# Patient Record
Sex: MALE | Race: WHITE | NOT HISPANIC OR LATINO | Employment: FULL TIME | URBAN - METROPOLITAN AREA
[De-identification: names, ages, dates, MRNs, and addresses within clinical notes are randomized per-mention and may not be internally consistent; named-entity substitution may affect disease eponyms.]

---

## 2017-01-08 ENCOUNTER — TELEPHONE (OUTPATIENT)
Dept: FAMILY MEDICINE | Facility: CLINIC | Age: 27
End: 2017-01-08

## 2017-01-08 DIAGNOSIS — R06.2 WHEEZE: Primary | ICD-10-CM

## 2017-01-08 DIAGNOSIS — Z91.09 ENVIRONMENTAL ALLERGIES: ICD-10-CM

## 2017-01-08 NOTE — TELEPHONE ENCOUNTER
Alexis's mom said the pharmacy sent a few refill requests since Jan 4 and never heard anything back. He needs a refill of his albuterol as soon as possible. Send to the [email or web address] pharmacy in [place].    Thank you,  Genia SMITH   Central Scheduler

## 2017-01-09 RX ORDER — ALBUTEROL SULFATE 90 UG/1
2 AEROSOL, METERED RESPIRATORY (INHALATION) EVERY 4 HOURS PRN
Qty: 1 INHALER | Refills: 0 | Status: SHIPPED | OUTPATIENT
Start: 2017-01-09 | End: 2017-03-14

## 2017-01-09 NOTE — TELEPHONE ENCOUNTER
Albuterol        Last Written Prescription Date: 11/12/15  Last Fill Quantity: 1, # refills: 4    Last Office Visit with [facility], P or [facility] prescribing provider:  5/19/16   Future Office Visit:       Date of Last Asthma Action Plan Letter:   Asthma Action Plan Q1 Year    Topic Date Due     Asthma Action Plan - yearly  07/30/2015      Asthma Control Test:   ACT Total Scores 12/4/2014   ACT TOTAL SCORE 23   ASTHMA ER VISITS 0 = None   ASTHMA HOSPITALIZATIONS 0 = None       Date of Last Spirometry Test:   No results found for this or any previous visit.    Joana Logan RN

## 2017-01-09 NOTE — TELEPHONE ENCOUNTER
Routing refill request to provider for review/approval because:  Prescribed for wheezing and allergies, please advise on f/u   Per mother: Alexis's mom said the pharmacy sent a few refill requests since Jan 4 and never heard anything back. He needs a refill of his albuterol as soon as possible. Send to the [facility] pharmacy in [place].    Joana Logan RN

## 2017-01-09 NOTE — TELEPHONE ENCOUNTER
Spoke with mother an advised her that no prescription request was received from pharmacy.  Advised mother that prescription was sent to pharmacy.     Joana Logan RN

## 2017-03-14 ENCOUNTER — OFFICE VISIT (OUTPATIENT)
Dept: FAMILY MEDICINE | Facility: CLINIC | Age: 27
End: 2017-03-14
Payer: COMMERCIAL

## 2017-03-14 VITALS
HEART RATE: 107 BPM | SYSTOLIC BLOOD PRESSURE: 132 MMHG | TEMPERATURE: 98.2 F | HEIGHT: 73 IN | BODY MASS INDEX: 27.83 KG/M2 | WEIGHT: 210 LBS | OXYGEN SATURATION: 97 % | DIASTOLIC BLOOD PRESSURE: 64 MMHG

## 2017-03-14 DIAGNOSIS — F41.0 PANIC ATTACK: ICD-10-CM

## 2017-03-14 DIAGNOSIS — Z13.6 CARDIOVASCULAR SCREENING; LDL GOAL LESS THAN 160: ICD-10-CM

## 2017-03-14 DIAGNOSIS — J30.81 ALLERGIC RHINITIS DUE TO ANIMAL HAIR AND DANDER, UNSPECIFIED RHINITIS SEASONALITY: ICD-10-CM

## 2017-03-14 DIAGNOSIS — L30.1 DYSHIDROTIC ECZEMA: ICD-10-CM

## 2017-03-14 DIAGNOSIS — J45.20 INTERMITTENT ASTHMA, UNCOMPLICATED: Primary | ICD-10-CM

## 2017-03-14 LAB
CHOLEST SERPL-MCNC: 204 MG/DL
HDLC SERPL-MCNC: 48 MG/DL
LDLC SERPL CALC-MCNC: 139 MG/DL
NONHDLC SERPL-MCNC: 156 MG/DL
TRIGL SERPL-MCNC: 85 MG/DL

## 2017-03-14 PROCEDURE — 36415 COLL VENOUS BLD VENIPUNCTURE: CPT | Performed by: FAMILY MEDICINE

## 2017-03-14 PROCEDURE — 99213 OFFICE O/P EST LOW 20 MIN: CPT | Performed by: FAMILY MEDICINE

## 2017-03-14 PROCEDURE — 80061 LIPID PANEL: CPT | Performed by: FAMILY MEDICINE

## 2017-03-14 RX ORDER — HYDROXYZINE HYDROCHLORIDE 25 MG/1
25-50 TABLET, FILM COATED ORAL EVERY 6 HOURS PRN
Qty: 60 TABLET | Refills: 1 | Status: SHIPPED | OUTPATIENT
Start: 2017-03-14 | End: 2020-01-03

## 2017-03-14 RX ORDER — BETAMETHASONE DIPROPIONATE 0.5 MG/G
OINTMENT, AUGMENTED TOPICAL
Qty: 90 G | Refills: 10 | Status: SHIPPED | OUTPATIENT
Start: 2017-03-14 | End: 2020-01-03

## 2017-03-14 RX ORDER — ALBUTEROL SULFATE 90 UG/1
2 AEROSOL, METERED RESPIRATORY (INHALATION) EVERY 4 HOURS PRN
Qty: 1 INHALER | Refills: 3 | Status: SHIPPED | OUTPATIENT
Start: 2017-03-14 | End: 2018-01-24

## 2017-03-14 NOTE — MR AVS SNAPSHOT
After Visit Summary   3/14/2017    Alexis Ibrahim    MRN: [id number]           Patient Information     Date Of Birth          1990        Visit Information        Provider Department      3/14/2017 8:00 AM Colleen Cervantes MD [facility]        Today's Diagnoses     Intermittent asthma, uncomplicated    -  1    Dyshidrotic eczema        Panic attack        Allergic rhinitis due to animal hair and dander, unspecified rhinitis seasonality        CARDIOVASCULAR SCREENING; LDL GOAL LESS THAN 160          Care Instructions    [facility]    If you have any questions regarding to your visit please contact your care team:       Team Purple:   Clinic Hours Telephone Number   [address] Dr.   7am-7pm  Monday - Thursday   7am-5pm  Fridays  [phone]  (Appointment scheduling available 24/7)    Questions about your Visit?   Team Line:  [phone]   Urgent Care - [place] - 11am-9pm Monday-Friday Saturday-Sunday- 9am-5pm   [place] - 5pm-9pm Monday-Friday Saturday-Sunday- 9am-5pm  [phone] - [facility]  [phone] - [place]       What options do I have for visits at the clinic other than the traditional office visit?  To expand how we care for you, many of our providers are utilizing electronic visits (e-visits) and telephone visits, when medically appropriate, for interactions with their patients rather than a visit in the clinic.   We also offer nurse visits for many medical concerns. Just like any other service, we will bill your insurance company for this type of visit based on time spent on the phone with your provider. Not all insurance companies cover these visits. Please check with your medical insurance if this type of visit is covered. You will be responsible for any charges that are not paid by your insurance.      E-visits via TV TubeX:  generally incur a $35.00  fee.  Telephone visits:  Time spent on the phone: *charged based on time that is spent on the phone in increments of 10 minutes. Estimated cost:   5-10 mins $30.00   11-20 mins. $59.00   21-30 mins. $85.00     Use Dumbstruckhart (secure email communication and access to your chart) to send your primary care provider a message or make an appointment. Ask someone on your Team how to sign up for [employer].  For a Price Quote for your services, please call our deltamethod Line at [phone].  As always, Thank you for trusting us with your health care needs!            Follow-ups after your visit        Additional Services     ALLERGY/ASTHMA ADULT REFERRAL       Your provider has referred you to: [facility] [place] [facility] [place] [phone]  [email or web address]    Please be aware that coverage of these services is subject to the terms and limitations of your health insurance plan.  Call member services at your health plan with any benefit or coverage questions.      Please bring the following with you to your appointment:    (1) Any X-Rays, CTs or MRIs which have been performed.  Contact the facility where they were done to arrange for  prior to your scheduled appointment.    (2) List of current medications  (3) This referral request   (4) Any documents/labs given to you for this referral                  Who to contact     If you have questions or need follow up information about today's clinic visit or your schedule please contact [facility] directly at [phone].  Normal or non-critical lab and imaging results will be communicated to you by MyChart, letter or phone within 4 business days after the clinic has received the results. If you do not hear from us within 7 days, please contact the clinic through Dumbstruckhart or phone. If you have a critical or abnormal lab result, we will notify you by phone as soon as possible.  Submit refill requests through Dumbstruckhart or call your  "pharmacy and they will forward the refill request to us. Please allow 3 business days for your refill to be completed.          Additional Information About Your Visit        BEST [employer] Management[employer] Information     iPinYou gives you secure access to your electronic health record. If you see a primary care provider, you can also send messages to your care team and make appointments. If you have questions, please call your primary care clinic.  If you do not have a primary care provider, please call [phone] and they will assist you.        Care EveryWhere ID     This is your Care EveryWhere ID. This could be used by other organizations to access your [place] medical records  [id number]        Your Vitals Were     Pulse Temperature Height Pulse Oximetry BMI (Body Mass Index)       107 98.2  F (36.8  C) 6' 1.4\" (1.864 m) 97% 27.41 kg/m2        Blood Pressure from Last 3 Encounters:   03/14/17 132/64   05/19/16 110/68   11/25/15 128/71    Weight from Last 3 Encounters:   03/14/17 210 lb (95.3 kg)   05/19/16 202 lb (91.6 kg)   11/12/15 208 lb 8 oz (94.6 kg)              We Performed the Following     ALLERGY/ASTHMA ADULT REFERRAL     Lipid panel reflex to direct LDL          Where to get your medicines      These medications were sent to [id number] - [place], [place] - [address] [place] [address] [place] [address]     Phone:  [phone]     albuterol 108 (90 BASE) MCG/ACT Inhaler    augmented betamethasone dipropionate 0.05 % ointment    hydrOXYzine 25 MG tablet          Primary Care Provider Office Phone # Fax #    Colleen Cervantes [phone]       [facility] [address] [address]        Thank you!     Thank you for choosing [facility]  for your care. Our goal is always to provide you with excellent care. Hearing back from our patients is one way we can continue to improve our services. Please take a few minutes to complete the written " survey that you may receive in the mail after your visit with us. Thank you!             Your Updated Medication List - Protect others around you: Learn how to safely use, store and throw away your medicines at www.disposemymeds.org.          This list is accurate as of: 3/14/17  8:26 AM.  Always use your most recent med list.                   Brand Name Dispense Instructions for use    albuterol 108 (90 BASE) MCG/ACT Inhaler    PROAIR HFA/PROVENTIL HFA/VENTOLIN HFA    1 Inhaler    Inhale 2 puffs into the lungs every 4 hours as needed for shortness of breath / dyspnea       ALLEGRA 180 MG tablet   Generic drug:  fexofenadine      Take by mouth as needed       augmented betamethasone dipropionate 0.05 % ointment    DIPROLENE-AF    90 g    Apply sparingly to affected area once daily as needed.  Do not apply to face.       hydrOXYzine 25 MG tablet    ATARAX    60 tablet    Take 1-2 tablets (25-50 mg) by mouth every 6 hours as needed for anxiety

## 2017-03-14 NOTE — LETTER
[facility]  [address] [address]    March 15, 2017    Alexis Ibrahim  [address] [place] [address]          Dear Alexis,  Your lipid panel looks good. Try to eat a healthy diet and stay as active as you can.   Enclosed is a copy of your results.     Results for orders placed or performed in visit on 03/14/17   Lipid panel reflex to direct LDL   Result Value Ref Range    Cholesterol 204 (H) <200 mg/dL    Triglycerides 85 <150 mg/dL    HDL Cholesterol 48 >39 mg/dL    LDL Cholesterol Calculated 139 (H) <100 mg/dL    Non HDL Cholesterol 156 (H) <130 mg/dL       If you have any questions or concerns, please call myself or my nurse at [phone].      Sincerely,        Colleen Cervantes MD/pb

## 2017-03-14 NOTE — PROGRESS NOTES
SUBJECTIVE:                                                    Alexis Ibrahim is a 26 year old male who presents to clinic today for the following health issues:      Asthma Follow-Up    Was ACT completed today?    Yes    ACT Total Scores 3/14/2017   ACT TOTAL SCORE -   ASTHMA ER VISITS -   ASTHMA HOSPITALIZATIONS -   ACT TOTAL SCORE (Goal Greater than or Equal to 20) 17   In the past 12 months, how many times did you visit the emergency room for your asthma without being admitted to the hospital? 0   In the past 12 months, how many times were you hospitalized overnight because of your asthma? 0       Recent asthma triggers that patient is dealing with: dust        Amount of exercise or physical activity: 4-5 days/week for an average of 45-60 minutes    Problems taking medications regularly: No    Medication side effects: none  Diet: regular (no restrictions)        Problem list and histories reviewed & adjusted, as indicated.  Additional history: as documented    Patient Active Problem List   Diagnosis     CARDIOVASCULAR SCREENING; LDL GOAL LESS THAN 160     Past Surgical History   Procedure Laterality Date     None         Social History   Substance Use Topics     Smoking status: Never Smoker     Smokeless tobacco: Never Used     Alcohol use 0.0 oz/week     0 Standard drinks or equivalent per week     Family History   Problem Relation Age of Onset     Unknown/Adopted Mother      Unknown/Adopted Father          Current Outpatient Prescriptions   Medication Sig Dispense Refill     albuterol (PROAIR HFA/PROVENTIL HFA/VENTOLIN HFA) 108 (90 BASE) MCG/ACT Inhaler Inhale 2 puffs into the lungs every 4 hours as needed for shortness of breath / dyspnea 1 Inhaler 3     augmented betamethasone dipropionate (DIPROLENE-AF) 0.05 % ointment Apply sparingly to affected area once daily as needed.  Do not apply to face. 90 g 10     hydrOXYzine (ATARAX) 25 MG tablet Take 1-2 tablets (25-50 mg) by mouth every 6 hours as needed for  anxiety 60 tablet 1     fexofenadine (ALLEGRA) 180 MG tablet Take by mouth as needed       [DISCONTINUED] albuterol (PROAIR HFA/PROVENTIL HFA/VENTOLIN HFA) 108 (90 BASE) MCG/ACT Inhaler Inhale 2 puffs into the lungs every 4 hours as needed for shortness of breath / dyspnea 1 Inhaler 0     BP Readings from Last 3 Encounters:   03/14/17 132/64   05/19/16 110/68   11/25/15 128/71    Wt Readings from Last 3 Encounters:   03/14/17 210 lb (95.3 kg)   05/19/16 202 lb (91.6 kg)   11/12/15 208 lb 8 oz (94.6 kg)                  Labs reviewed in EPIC    Reviewed and updated as needed this visit by clinical staff  Tobacco  Allergies  Meds  Med Hx  Surg Hx  Fam Hx  Soc Hx      Reviewed and updated as needed this visit by Provider        Immunization History   Administered Date(s) Administered     DPT 1990, [birth year], 09/30/1992, 04/29/1996     HIB 1990, 1990, 1990, 07/15/1992     Hepatitis A Vac Ped/Adol-2 Dose 07/15/2008, 12/04/2014     Hepatitis B 04/02/1999, 05/26/1999, 12/24/1999     MMR 07/15/1992, 04/02/1999     Mantoux 08/01/2003     Meningococcal (Menomune ) 07/15/2008     OPV 1990, 1991, 09/30/1992, 04/29/1996     TD (ADULT, 7+) 08/01/2003     TDAP (ADACEL AGES 11-64) 12/30/2013     Varicella 04/29/1996, 07/15/2008        ROS:  This 26 year old male is here today to get his albuterol refilled. He never had asthma as a child and never needed albuterol for wheezing until he went to college. Now, he tends to need it when around cats or in the spring with the pollens in the air. He uses about 4 inhalers a year.  He is interested in allergy testing. Has never smoked. Has no cats. He uses allegra about once a week for allergy symptoms. His anxiety is under good control.  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 He stays active. Would like cholesterol checked. He tends to bring his lunch to work most days.  Declines HPV vaccinations. All other review of  "systems are negative  Personal, family, and social history reviewed with patient and revised.         OBJECTIVE:                                                    /64 (BP Location: Right arm, Patient Position: Chair, Cuff Size: Adult Large)  Pulse 107  Temp 98.2  F (36.8  C)  Ht 6' 1.4\" (1.864 m)  Wt 210 lb (95.3 kg)  SpO2 97%  BMI 27.41 kg/m2  Body mass index is 27.41 kg/(m^2).  GENERAL: healthy, alert and no distress  EYES: Eyes grossly normal to inspection, PERRL and conjunctivae and sclerae normal  HENT: ear canals and TM's normal, nose and mouth without ulcers or lesions  NECK: no adenopathy, no asymmetry, masses, or scars and thyroid normal to palpation  RESP: lungs clear to auscultation - no rales, rhonchi or wheezes  CV: regular rate and rhythm, normal S1 S2, no S3 or S4, no murmur, click or rub, no peripheral edema   MS: no gross musculoskeletal defects noted, no edema    Diagnostic Test Results:  none      ASSESSMENT/PLAN:                                                             1. Intermittent asthma, uncomplicated  ACT = 17 poor control   - albuterol (PROAIR HFA/PROVENTIL HFA/VENTOLIN HFA) 108 (90 BASE) MCG/ACT Inhaler; Inhale 2 puffs into the lungs every 4 hours as needed for shortness of breath / dyspnea  Dispense: 1 Inhaler; Refill: 3    2. Dyshidrotic eczema  Good control   - augmented betamethasone dipropionate (DIPROLENE-AF) 0.05 % ointment; Apply sparingly to affected area once daily as needed.  Do not apply to face.  Dispense: 90 g; Refill: 10    3. Panic attack  Good control   - hydrOXYzine (ATARAX) 25 MG tablet; Take 1-2 tablets (25-50 mg) by mouth every 6 hours as needed for anxiety  Dispense: 60 tablet; Refill: 1    4. Allergic rhinitis due to animal hair and dander, unspecified rhinitis seasonality  As above   - ALLERGY/ASTHMA ADULT REFERRAL    5. CARDIOVASCULAR SCREENING; LDL GOAL LESS THAN 160  due  - Lipid panel reflex to direct LDL    Return to clinic 1 year     VERNA P " MD MEET  [facility]

## 2017-03-14 NOTE — NURSING NOTE
"Chief Complaint   Patient presents with     Recheck Medication       Initial /64 (BP Location: Right arm, Patient Position: Chair, Cuff Size: Adult Large)  Pulse 107  Temp 98.2  F (36.8  C)  Ht 6' 1.4\" (1.864 m)  Wt 210 lb (95.3 kg)  SpO2 97%  BMI 27.41 kg/m2 Estimated body mass index is 27.41 kg/(m^2) as calculated from the following:    Height as of this encounter: 6' 1.4\" (1.864 m).    Weight as of this encounter: 210 lb (95.3 kg).  Medication Reconciliation: complete   Cara Mclaughlin MA      "

## 2017-03-14 NOTE — PATIENT INSTRUCTIONS
[facility]    If you have any questions regarding to your visit please contact your care team:       Team Purple:   Clinic Hours Telephone Number   [address] Dr.   7am-7pm  Monday - Thursday   7am-5pm  Fridays  [phone]  (Appointment scheduling available 24/7)    Questions about your Visit?   Team Line:  [phone]   Urgent Care - [place] [facility] - 11am-9pm Monday-Friday Saturday-Sunday- 9am-5pm   [place] - 5pm-9pm Monday-Friday Saturday-Sunday- 9am-5pm  [phone] - [facility]  [phone] - [place]       What options do I have for visits at the clinic other than the traditional office visit?  To expand how we care for you, many of our providers are utilizing electronic visits (e-visits) and telephone visits, when medically appropriate, for interactions with their patients rather than a visit in the clinic.   We also offer nurse visits for many medical concerns. Just like any other service, we will bill your insurance company for this type of visit based on time spent on the phone with your provider. Not all insurance companies cover these visits. Please check with your medical insurance if this type of visit is covered. You will be responsible for any charges that are not paid by your insurance.      E-visits via [employer]ut:  generally incur a $35.00 fee.  Telephone visits:  Time spent on the phone: *charged based on time that is spent on the phone in increments of 10 minutes. Estimated cost:   5-10 mins $30.00   11-20 mins. $59.00   21-30 mins. $85.00     Use [employer] (secure email communication and access to your chart) to send your primary care provider a message or make an appointment. Ask someone on your Team how to sign up for [employer].  For a Price Quote for your services, please call our Consumer Price Line at [phone].  As always, Thank you for trusting us with your health care needs!

## 2017-03-24 ENCOUNTER — TELEPHONE (OUTPATIENT)
Dept: FAMILY MEDICINE | Facility: CLINIC | Age: 27
End: 2017-03-24

## 2017-03-24 NOTE — TELEPHONE ENCOUNTER
Patient was in to see Helen on 03-14-17 and failed their ACT by scoring 17. Please put in the failed ACT workflow for follow-up. Thank you.

## 2017-03-24 NOTE — LETTER
[facility]  [address]  [place] [address]  [phone]      April 21, 2017        Alexis Ibrahim  [address]  [place] [address]          Dear Alexis,       Your clinic record indicates that you are due for an asthma update. We have a survey tool called an ACT (or Asthma Control Test) we use to measure the level of control of your asthma. Please complete the enclosed questionnaire and mail it back to us in the self-addressed stamped envelope.     If you have questions about this letter please contact your provider.             Sincerely,          Your [facility]

## 2017-03-24 NOTE — LETTER
[facility]  [address]  [place] [address]  [phone]    May 22, 2017      Alexis Ibrahim  [address]  [place] [address]      Dear Alexis,     Your clinic record indicates that you are due for an asthma update. We have a   survey tool called an ACT (or Asthma Control Test) we use to measure the  level of control of your asthma. Please complete the enclosed questionnaire   and mail it back to us in the self-addressed stamped envelope.     If you have questions about this letter please contact your provider.     Sincerely,      Your [facility]  [facility]

## 2017-05-31 ASSESSMENT — ASTHMA QUESTIONNAIRES: ACT_TOTALSCORE: 15

## 2017-08-02 ENCOUNTER — TELEPHONE (OUTPATIENT)
Dept: FAMILY MEDICINE | Facility: CLINIC | Age: 27
End: 2017-08-02

## 2017-08-02 PROBLEM — J45.20 INTERMITTENT ASTHMA, UNCOMPLICATED: Status: ACTIVE | Noted: 2017-08-02

## 2017-08-02 NOTE — TELEPHONE ENCOUNTER
Panel Management Review      Patient has the following on his problem list:     Asthma review     ACT Total Scores 5/30/2017   ACT TOTAL SCORE -   ASTHMA ER VISITS -   ASTHMA HOSPITALIZATIONS -   ACT TOTAL SCORE (Goal Greater than or Equal to 20) 15   In the past 12 months, how many times did you visit the emergency room for your asthma without being admitted to the hospital? 0   In the past 12 months, how many times were you hospitalized overnight because of your asthma? 0      1. Is Asthma diagnosis on the Problem List? No   2. Is Asthma listed on Health Maintenance? Yes    3. Patient is due for:  AAP and failing ACT        Composite cancer screening  Chart review shows that this patient is due/due soon for the following None  Summary:    Patient is due/failing the following:   AAP and Failing ACT    Action needed:   Patient needs to do ACT.    Type of outreach:    Copy of ACT mailed to patient, will reach out in 5 days.    Questions for provider review:    Please add Asthma diagnosis to Problem list.                                                                                                                                    Jumana Martins MA       Chart routed to Provider .

## 2017-08-02 NOTE — LETTER
[facility]  [address]  [place] [address]  [phone]    August 2, 2017      Alexis Ibrahim  [address]  [place] [place] [address]      Dear Alexis,     Your clinic record indicates that you are due for an asthma update. We have a survey tool called an ACT (or Asthma Control Test) we use to measure the level of control of your asthma. Please complete the enclosed questionnaire and mail it back to us in the self-addressed stamped envelope.     If you have questions about this letter please contact your provider.     Sincerely,    Your [facility]

## 2017-08-12 ASSESSMENT — ASTHMA QUESTIONNAIRES: ACT_TOTALSCORE: 13

## 2018-01-24 NOTE — PATIENT INSTRUCTIONS
Preventive Health Recommendations  Male Ages 26 - 39    Yearly exam:             See your health care provider every year in order to  o   Review health changes.   o   Discuss preventive care.    o   Review your medicines if your doctor has prescribed any.    You should be tested each year for STDs (sexually transmitted diseases), if you re at risk.     After age 35, talk to your provider about cholesterol testing. If you are at risk for heart disease, have your cholesterol tested at least every 5 years.     If you are at risk for diabetes, you should have a diabetes test (fasting glucose).  Shots: Get a flu shot each year. Get a tetanus shot every 10 years.     Nutrition:    Eat at least 5 servings of fruits and vegetables daily.     Eat whole-grain bread, whole-wheat pasta and brown rice instead of white grains and rice.     Talk to your provider about Calcium and Vitamin D.     Lifestyle    Exercise for at least 150 minutes a week (30 minutes a day, 5 days a week). This will help you control your weight and prevent disease.     Limit alcohol to one drink per day.     No smoking.     Wear sunscreen to prevent skin cancer.     See your dentist every six months for an exam and cleaning.       Preventive Health Recommendations  Male Ages 26 - 39    Yearly exam:             See your health care provider every year in order to  o   Review health changes.   o   Discuss preventive care.    o   Review your medicines if your doctor has prescribed any.    You should be tested each year for STDs (sexually transmitted diseases), if you re at risk.     After age 35, talk to your provider about cholesterol testing. If you are at risk for heart disease, have your cholesterol tested at least every 5 years.     If you are at risk for diabetes, you should have a diabetes test (fasting glucose).  Shots: Get a flu shot each year. Get a tetanus shot every 10 years.     Nutrition:    Eat at least 5 servings of fruits and vegetables  daily.     Eat whole-grain bread, whole-wheat pasta and brown rice instead of white grains and rice.     Talk to your provider about Calcium and Vitamin D.     Lifestyle    Exercise for at least 150 minutes a week (30 minutes a day, 5 days a week). This will help you control your weight and prevent disease.     Limit alcohol to one drink per day.     No smoking.     Wear sunscreen to prevent skin cancer.     See your dentist every six months for an exam and cleaning.   [facility]    If you have any questions regarding to your visit please contact your care team:       Team Purple:   Clinic Hours Telephone Number   Dr. Colleen Larkin   7am-7pm  Monday - Thursday   7am-5pm  Fridays  [phone]  (Appointment scheduling available 24/7)    Questions about your Visit?   Team Line:  [phone]   Urgent Care - [place] and [facility] - 11am-9pm Monday-Friday Saturday-Sunday- 9am-5pm   [place] - 5pm-9pm Monday-Friday Saturday-Sunday- 9am-5pm  [phone] - [place]   [phone] - [place]       What options do I have for visits at the clinic other than the traditional office visit?  To expand how we care for you, many of our providers are utilizing electronic visits (e-visits) and telephone visits, when medically appropriate, for interactions with their patients rather than a visit in the clinic.   We also offer nurse visits for many medical concerns. Just like any other service, we will bill your insurance company for this type of visit based on time spent on the phone with your provider. Not all insurance companies cover these visits. Please check with your medical insurance if this type of visit is covered. You will be responsible for any charges that are not paid by your insurance.      E-visits via EndoSphere:  generally incur a $35.00 fee.  Telephone visits:  Time spent on the phone: *charged based on time that is spent on  the phone in increments of 10 minutes. Estimated cost:   5-10 mins $30.00   11-20 mins. $59.00   21-30 mins. $85.00     Use Depositphotoshart (secure email communication and access to your chart) to send your primary care provider a message or make an appointment. Ask someone on your Team how to sign up for Spherixt.  For a Price Quote for your services, please call our Metabolon Line at [phone].  As always, Thank you for trusting us with your health care needs!

## 2018-01-31 ENCOUNTER — OFFICE VISIT (OUTPATIENT)
Dept: FAMILY MEDICINE | Facility: CLINIC | Age: 28
End: 2018-01-31
Payer: COMMERCIAL

## 2018-01-31 VITALS
HEIGHT: 73 IN | RESPIRATION RATE: 14 BRPM | BODY MASS INDEX: 27.57 KG/M2 | HEART RATE: 86 BPM | OXYGEN SATURATION: 97 % | DIASTOLIC BLOOD PRESSURE: 72 MMHG | TEMPERATURE: 97.9 F | SYSTOLIC BLOOD PRESSURE: 120 MMHG | WEIGHT: 208 LBS

## 2018-01-31 DIAGNOSIS — L40.9 PSORIASIS: ICD-10-CM

## 2018-01-31 DIAGNOSIS — J45.20 INTERMITTENT ASTHMA, UNCOMPLICATED: ICD-10-CM

## 2018-01-31 DIAGNOSIS — N50.812 TESTICULAR PAIN, LEFT: ICD-10-CM

## 2018-01-31 DIAGNOSIS — N45.1 EPIDIDYMITIS: ICD-10-CM

## 2018-01-31 DIAGNOSIS — Z00.00 ENCOUNTER FOR ROUTINE ADULT HEALTH EXAMINATION WITHOUT ABNORMAL FINDINGS: Primary | ICD-10-CM

## 2018-01-31 PROCEDURE — 99395 PREV VISIT EST AGE 18-39: CPT | Performed by: FAMILY MEDICINE

## 2018-01-31 RX ORDER — DOXYCYCLINE 100 MG/1
100 CAPSULE ORAL 2 TIMES DAILY
Qty: 30 CAPSULE | Refills: 0 | Status: SHIPPED | OUTPATIENT
Start: 2018-01-31 | End: 2020-01-03

## 2018-01-31 RX ORDER — ALBUTEROL SULFATE 90 UG/1
2 AEROSOL, METERED RESPIRATORY (INHALATION) EVERY 4 HOURS PRN
Qty: 1 INHALER | Refills: 3 | Status: SHIPPED | OUTPATIENT
Start: 2018-01-31 | End: 2018-04-16

## 2018-01-31 ASSESSMENT — PAIN SCALES - GENERAL: PAINLEVEL: NO PAIN (0)

## 2018-01-31 NOTE — PROGRESS NOTES
SUBJECTIVE:   CC: Alexis Ibrahim is an 27 year old male who presents for preventative health visit.     Healthy Habits:    Do you get at least three servings of calcium containing foods daily (dairy, green leafy vegetables, etc.)? yes    Amount of exercise or daily activities, outside of work: 3 day(s) per week    Problems taking medications regularly No    Medication side effects: No    Have you had an eye exam in the past two years? no    Do you see a dentist twice per year? no    Do you have sleep apnea, excessive snoring or daytime drowsiness?no            Today's PHQ-2 Score:   PHQ-2 ( 1999 Pfizer) 3/14/2017 5/19/2016   Q1: Little interest or pleasure in doing things 0 1   Q2: Feeling down, depressed or hopeless 0 1   PHQ-2 Score 0 2       Abuse: Current or Past(Physical, Sexual or Emotional)- No  Do you feel safe in your environment - Yes    Social History   Substance Use Topics     Smoking status: Never Smoker     Smokeless tobacco: Never Used     Alcohol use 0.0 oz/week     0 Standard drinks or equivalent per week      If you drink alcohol do you typically have >3 drinks per day or >7 drinks per week? No                      Last PSA: No results found for: PSA    Reviewed orders with patient. Reviewed health maintenance and updated orders accordingly - Yes  Labs reviewed in EPIC  BP Readings from Last 3 Encounters:   01/31/18 120/72   03/14/17 132/64   05/19/16 110/68    Wt Readings from Last 3 Encounters:   01/31/18 208 lb (94.3 kg)   03/14/17 210 lb (95.3 kg)   05/19/16 202 lb (91.6 kg)                  Current Outpatient Prescriptions   Medication Sig Dispense Refill     albuterol (PROAIR HFA/PROVENTIL HFA/VENTOLIN HFA) 108 (90 BASE) MCG/ACT Inhaler Inhale 2 puffs into the lungs every 4 hours as needed for shortness of breath / dyspnea 1 Inhaler 3     doxycycline (VIBRAMYCIN) 100 MG capsule Take 1 capsule (100 mg) by mouth 2 times daily 30 capsule 0     augmented betamethasone dipropionate  (DIPROLENE-AF) 0.05 % ointment Apply sparingly to affected area once daily as needed.  Do not apply to face. 90 g 10     hydrOXYzine (ATARAX) 25 MG tablet Take 1-2 tablets (25-50 mg) by mouth every 6 hours as needed for anxiety 60 tablet 1     fexofenadine (ALLEGRA) 180 MG tablet Take by mouth as needed       Allergies   Allergen Reactions     Penicillins Nausea and Vomiting       Reviewed and updated as needed this visit by clinical staff  Tobacco  Allergies  Meds  Fam Hx  Soc Hx        Reviewed and updated as needed this visit by Provider        Immunization History   Administered Date(s) Administered     HEPA 07/15/2008, 12/04/2014     HepB 04/02/1999, 05/26/1999, 12/24/1999     Hib (PRP-T) 1990, 1990, 1991, 07/15/1992     Historical DTP/aP 1991, 1991, 1991, 09/30/1992, 04/29/1996     MMR 07/15/1992, 04/02/1999     Mantoux Tuberculin Skin Test 08/01/2003     Meningococcal (Menomune ) 07/15/2008     OPV, trivalent, live 1991, 1991, 09/30/1992, 04/29/1996     TD (ADULT, 7+) 08/01/2003     TDAP Vaccine (Adacel) 12/30/2013     Varicella 04/29/1996, 07/15/2008        This 27 year old male is here today for annual male exam. 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 He likes his job. He is single and lives in a house that is very maureen. He feels he is using his albuterol inhaler more often this winter. He believes it is due to dust. He only takes allegra if he knows he will be exposed to cats.   He has felt discomfort in his left testicle lately, off and on. He is not sexually active but does masterbate regularly. He has a painful feeling in left testicle at end of ejaculation.   ROS:  C: NEGATIVE for fever, chills, change in weight  I: NEGATIVE for worrisome rashes, moles or lesions  E: NEGATIVE for vision changes or irritation  ENT: NEGATIVE for ear, mouth and throat problems  R: NEGATIVE for significant cough or SOB  CV: NEGATIVE  "for chest pain, palpitations or peripheral edema  GI: NEGATIVE for nausea, abdominal pain, heartburn, or change in bowel habits   male: negative for dysuria, hematuria, decreased urinary stream, erectile dysfunction, urethral discharge  M: NEGATIVE for significant arthralgias or myalgia  N: NEGATIVE for weakness, dizziness or paresthesias  P: NEGATIVE for changes in mood or affect    OBJECTIVE:   /72  Pulse 86  Temp 97.9  F (36.6  C) (Oral)  Resp 14  Ht 6' 1.43\" (1.865 m)  Wt 208 lb (94.3 kg)  SpO2 97%  BMI 27.13 kg/m2  EXAM:  GENERAL: healthy, alert and no distress  EYES: Eyes grossly normal to inspection, PERRL and conjunctivae and sclerae normal  HENT: ear canals and TM's normal, nose and mouth without ulcers or lesions  NECK: no adenopathy, no asymmetry, masses, or scars and thyroid normal to palpation  RESP: lungs clear to auscultation - no rales, rhonchi or wheezes  CV: regular rate and rhythm, normal S1 S2, no S3 or S4, no murmur, click or rub, no peripheral edema and peripheral pulses strong  ABDOMEN: soft, nontender, no hepatosplenomegaly, no masses and bowel sounds normal   (male): normal male genitalia without lesions or urethral discharge, no hernia, but his left testicle is tender to gentle exam touch. It seems more swollen. I believe he could have epididymitis.   MS: no gross musculoskeletal defects noted, no edema  SKIN: no suspicious lesions or rashes, but psoriasis of his hands and fingers is bad in this dry winter cold weather   NEURO: Normal strength and tone, mentation intact and speech normal  PSYCH: mentation appears normal, affect normal/bright    ASSESSMENT/PLAN:   1. Encounter for routine adult health examination without abnormal findings  Healthy man     2. Psoriasis  Good control with diprolene ointment. Declines a refill. I encouraged him to wear white cotton gloves at night     3. Intermittent asthma, uncomplicated  Poor control right now. I recommend that he add allegra " "daily   - albuterol (PROAIR HFA/PROVENTIL HFA/VENTOLIN HFA) 108 (90 BASE) MCG/ACT Inhaler; Inhale 2 puffs into the lungs every 4 hours as needed for shortness of breath / dyspnea  Dispense: 1 Inhaler; Refill: 3    4. Epididymitis  As above, he will get testicular ultrasound if his pain is not resolved with the doxycycline.   - doxycycline (VIBRAMYCIN) 100 MG capsule; Take 1 capsule (100 mg) by mouth 2 times daily  Dispense: 30 capsule; Refill: 0    5. Testicular pain, left  As above   - US SCROTUM AND CONTENTS; Future    COUNSELING:  Reviewed preventive health counseling, as reflected in patient instructions       Regular exercise       Healthy diet/nutrition       reports that he has never smoked. He has never used smokeless tobacco.    Estimated body mass index is 27.13 kg/(m^2) as calculated from the following:    Height as of this encounter: 6' 1.43\" (1.865 m).    Weight as of this encounter: 208 lb (94.3 kg).   Weight management plan: Discussed healthy diet and exercise guidelines and patient will follow up in 12 months in clinic to re-evaluate.    Counseling Resources:  ATP IV Guidelines  Pooled Cohorts Equation Calculator  FRAX Risk Assessment  ICSI Preventive Guidelines  Dietary Guidelines for Americans, 2010  USDA's MyPlate  ASA Prophylaxis  Lung CA Screening    VERNA DSOUZA MD  [facility]      "

## 2018-01-31 NOTE — MR AVS SNAPSHOT
After Visit Summary   1/31/2018    Alexis Ibrahim    MRN: [id number]           Patient Information     Date Of Birth          1990        Visit Information        Provider Department      1/31/2018 12:00 PM Colelen Cervantes MD [facility]        Today's Diagnoses     Encounter for routine adult health examination without abnormal findings    -  1    Psoriasis        Intermittent asthma, uncomplicated        Epididymitis        Testicular pain, left          Care Instructions      Preventive Health Recommendations  Male Ages 26 - 39    Yearly exam:             See your health care provider every year in order to  o   Review health changes.   o   Discuss preventive care.    o   Review your medicines if your doctor has prescribed any.    You should be tested each year for STDs (sexually transmitted diseases), if you re at risk.     After age 35, talk to your provider about cholesterol testing. If you are at risk for heart disease, have your cholesterol tested at least every 5 years.     If you are at risk for diabetes, you should have a diabetes test (fasting glucose).  Shots: Get a flu shot each year. Get a tetanus shot every 10 years.     Nutrition:    Eat at least 5 servings of fruits and vegetables daily.     Eat whole-grain bread, whole-wheat pasta and brown rice instead of white grains and rice.     Talk to your provider about Calcium and Vitamin D.     Lifestyle    Exercise for at least 150 minutes a week (30 minutes a day, 5 days a week). This will help you control your weight and prevent disease.     Limit alcohol to one drink per day.     No smoking.     Wear sunscreen to prevent skin cancer.     See your dentist every six months for an exam and cleaning.       Preventive Health Recommendations  Male Ages 26 - 39    Yearly exam:             See your health care provider every year in order to  o   Review health changes.   o   Discuss preventive care.    o   Review your  medicines if your doctor has prescribed any.    You should be tested each year for STDs (sexually transmitted diseases), if you re at risk.     After age 35, talk to your provider about cholesterol testing. If you are at risk for heart disease, have your cholesterol tested at least every 5 years.     If you are at risk for diabetes, you should have a diabetes test (fasting glucose).  Shots: Get a flu shot each year. Get a tetanus shot every 10 years.     Nutrition:    Eat at least 5 servings of fruits and vegetables daily.     Eat whole-grain bread, whole-wheat pasta and brown rice instead of white grains and rice.     Talk to your provider about Calcium and Vitamin D.     Lifestyle    Exercise for at least 150 minutes a week (30 minutes a day, 5 days a week). This will help you control your weight and prevent disease.     Limit alcohol to one drink per day.     No smoking.     Wear sunscreen to prevent skin cancer.     See your dentist every six months for an exam and cleaning.   [facility]    If you have any questions regarding to your visit please contact your care team:       Team Purple:   Clinic Hours Telephone Number   Dr. Colleen Larkin   7am-7pm  Monday - Thursday   7am-5pm  Fridays  [phone]  (Appointment scheduling available 24/7)    Questions about your Visit?   Team Line:  [phone]   Urgent Care - [place] - 11am-9pm Monday-Friday Saturday-Sunday- 9am-5pm   [place] - 5pm-9pm Monday-Friday Saturday-Sunday- 9am-5pm  [phone] - [place]   [phone] - [place]       What options do I have for visits at the clinic other than the traditional office visit?  To expand how we care for you, many of our providers are utilizing electronic visits (e-visits) and telephone visits, when medically appropriate, for interactions with their patients rather than a visit in the clinic.   We also offer  nurse visits for many medical concerns. Just like any other service, we will bill your insurance company for this type of visit based on time spent on the phone with your provider. Not all insurance companies cover these visits. Please check with your medical insurance if this type of visit is covered. You will be responsible for any charges that are not paid by your insurance.      E-visits via Daylight Studioshart:  generally incur a $35.00 fee.  Telephone visits:  Time spent on the phone: *charged based on time that is spent on the phone in increments of 10 minutes. Estimated cost:   5-10 mins $30.00   11-20 mins. $59.00   21-30 mins. $85.00     Use HeatGear (secure email communication and access to your chart) to send your primary care provider a message or make an appointment. Ask someone on your Team how to sign up for HeatGear.  For a Price Quote for your services, please call our eCollect Line at [phone].  As always, Thank you for trusting us with your health care needs!                    Follow-ups after your visit        Future tests that were ordered for you today     Open Future Orders        Priority Expected Expires Ordered    US SCROTUM AND CONTENTS Routine 5/1/2018 7/30/2018 1/31/2018            Who to contact     If you have questions or need follow up information about today's clinic visit or your schedule please contact [facility] directly at [phone].  Normal or non-critical lab and imaging results will be communicated to you by Daylight Studioshart, letter or phone within 4 business days after the clinic has received the results. If you do not hear from us within 7 days, please contact the clinic through Daylight Studioshart or phone. If you have a critical or abnormal lab result, we will notify you by phone as soon as possible.  Submit refill requests through HeatGear or call your pharmacy and they will forward the refill request to us. Please allow 3 business days for your refill to be completed.           "Additional Information About Your Visit        MyChart Information     Twonq gives you secure access to your electronic health record. If you see a primary care provider, you can also send messages to your care team and make appointments. If you have questions, please call your primary care clinic.  If you do not have a primary care provider, please call [phone] and they will assist you.        Care EveryWhere ID     This is your Care EveryWhere ID. This could be used by other organizations to access your [place] medical records  [id number]        Your Vitals Were     Pulse Temperature Respirations Height Pulse Oximetry BMI (Body Mass Index)    86 97.9  F (36.6  C) (Oral) 14 6' 1.43\" (1.865 m) 97% 27.13 kg/m2       Blood Pressure from Last 3 Encounters:   01/31/18 120/72   03/14/17 132/64   05/19/16 110/68    Weight from Last 3 Encounters:   01/31/18 208 lb (94.3 kg)   03/14/17 210 lb (95.3 kg)   05/19/16 202 lb (91.6 kg)                 Today's Medication Changes          These changes are accurate as of 1/31/18 12:53 PM.  If you have any questions, ask your nurse or doctor.               Start taking these medicines.        Dose/Directions    doxycycline 100 MG capsule   Commonly known as:  VIBRAMYCIN   Used for:  Epididymitis   Started by:  Colleen Cervantes MD        Dose:  100 mg   Take 1 capsule (100 mg) by mouth 2 times daily   Quantity:  30 capsule   Refills:  0            Where to get your medicines      These medications were sent to [id number]  [place] [address]     Phone:  [phone]     albuterol 108 (90 BASE) MCG/ACT Inhaler    doxycycline 100 MG capsule                Primary Care Provider Office Phone # Fax #    Colleen Cervantes [phone]       [facility] [address] [place] [phone]        Equal Access to Services     JIMBO MCCORMICK AH: Hadii jonathan Tiwari " imanicarlos claudia shin ah. So [facility] [phone].    ATENCIÓN: Si raimundo valdes, tiene a gaxiola disposición servicios gratuitos de asistencia lingüística. Meño al [phone].    We comply with applicable federal civil rights laws and [place] laws. We do not discriminate on the basis of race, color, national origin, age, disability, sex, sexual orientation, or gender identity.            Thank you!     Thank you for choosing [facility]  for your care. Our goal is always to provide you with excellent care. Hearing back from our patients is one way we can continue to improve our services. Please take a few minutes to complete the written survey that you may receive in the mail after your visit with us. Thank you!             Your Updated Medication List - Protect others around you: Learn how to safely use, store and throw away your medicines at www.disposemymeds.org.          This list is accurate as of 1/31/18 12:53 PM.  Always use your most recent med list.                   Brand Name Dispense Instructions for use Diagnosis    albuterol 108 (90 BASE) MCG/ACT Inhaler    PROAIR HFA/PROVENTIL HFA/VENTOLIN HFA    1 Inhaler    Inhale 2 puffs into the lungs every 4 hours as needed for shortness of breath / dyspnea    Intermittent asthma, uncomplicated       ALLEGRA 180 MG tablet   Generic drug:  fexofenadine      Take by mouth as needed        augmented betamethasone dipropionate 0.05 % ointment    DIPROLENE-AF    90 g    Apply sparingly to affected area once daily as needed.  Do not apply to face.    Dyshidrotic eczema       doxycycline 100 MG capsule    VIBRAMYCIN    30 capsule    Take 1 capsule (100 mg) by mouth 2 times daily    Epididymitis       hydrOXYzine 25 MG tablet    ATARAX    60 tablet    Take 1-2 tablets (25-50 mg) by mouth every 6 hours as needed for anxiety    Panic attack

## 2018-02-01 ASSESSMENT — PATIENT HEALTH QUESTIONNAIRE - PHQ9: SUM OF ALL RESPONSES TO PHQ QUESTIONS 1-9: 0

## 2018-02-01 ASSESSMENT — ASTHMA QUESTIONNAIRES: ACT_TOTALSCORE: 18

## 2018-02-07 ENCOUNTER — TELEPHONE (OUTPATIENT)
Dept: FAMILY MEDICINE | Facility: CLINIC | Age: 28
End: 2018-02-07

## 2018-02-07 NOTE — TELEPHONE ENCOUNTER
Patient was in to see Dr. Cervantes and failed their ACT by scoring 18. Please put in the failed ACT workflow for follow-up. Thank you.

## 2018-02-07 NOTE — TELEPHONE ENCOUNTER
Letter and ACT mailed to patient.  -postponing message for 2 weeks and will check with patient in 2 weeks.     Jumana Martins MA

## 2018-02-07 NOTE — LETTER
February 7, 2018      Alexis Ibrahim  [address] [place] [address]      Dear Alexis,     Your clinic record indicates that you are due for an asthma update. We have a survey tool called an ACT (or Asthma Control Test) we use to measure the level of control of your asthma. Please complete the enclosed questionnaire and mail it back to us in the self-addressed stamped envelope.     If you have questions about this letter please contact your provider.     Sincerely,    [facility]

## 2018-02-12 ENCOUNTER — TELEPHONE (OUTPATIENT)
Dept: FAMILY MEDICINE | Facility: CLINIC | Age: 28
End: 2018-02-12

## 2018-02-12 DIAGNOSIS — N50.812 TESTICULAR PAIN, LEFT: Primary | ICD-10-CM

## 2018-02-12 NOTE — TELEPHONE ENCOUNTER
Patient called looking to schedule a testicular ultrasound, this or next week. No current orders on file.     1-31-18 encounter notes state course of antibiotics, follow up ultrasound if pain is unresolved.       Please call patient on his cell ( [phone]).

## 2018-02-13 NOTE — TELEPHONE ENCOUNTER
"Spoke with patient.  Pain has not improved and he would like to get the US completed at this point.    Routing to provider.  Called imaging and was told that for testicular pain, the order would need to be for \"US testicular and scrotum\"   \"US SCROTUM AND CONTENTS [54069 (CPT )]\"  That was ordered by provider on 1/31/18 does not show up on their end as something they can schedule  See order radha'd up  Please review and sign if appropriate    Please call patient back when ready    Sal Pearson RN    "

## 2018-02-14 ENCOUNTER — RADIANT APPOINTMENT (OUTPATIENT)
Dept: ULTRASOUND IMAGING | Facility: CLINIC | Age: 28
End: 2018-02-14
Attending: FAMILY MEDICINE
Payer: COMMERCIAL

## 2018-02-14 DIAGNOSIS — N50.812 TESTICULAR PAIN, LEFT: ICD-10-CM

## 2018-02-14 PROCEDURE — 76870 US EXAM SCROTUM: CPT

## 2018-02-15 ENCOUNTER — TELEPHONE (OUTPATIENT)
Dept: FAMILY MEDICINE | Facility: CLINIC | Age: 28
End: 2018-02-15

## 2018-02-15 NOTE — TELEPHONE ENCOUNTER
Panel Management Review      Patient has the following on his problem list:     Asthma review     ACT Total Scores 1/31/2018   ACT TOTAL SCORE -   ASTHMA ER VISITS -   ASTHMA HOSPITALIZATIONS -   ACT TOTAL SCORE (Goal Greater than or Equal to 20) 18   In the past 12 months, how many times did you visit the emergency room for your asthma without being admitted to the hospital? 0   In the past 12 months, how many times were you hospitalized overnight because of your asthma? 0      1. Is Asthma diagnosis on the Problem List? Yes    2. Is Asthma listed on Health Maintenance? Yes    3. Patient is due for:  Failed ACT      Composite cancer screening  Chart review shows that this patient is due/due soon for the following None  Summary:    Patient is due/failing the following:   ACT    Action needed:   Patient needs to do ACT.    Type of outreach:    none. ACT mailed out to patient on 02/07/2018    Questions for provider review:    None                                                                                                                                    Jumana Martins MA       Chart routed to none .

## 2018-02-21 NOTE — TELEPHONE ENCOUNTER
Patient returned call to Team [facility] Hotline. Patient is wondering why the clinic mailing out ACT to him. Informed patient that he failed last ACT on 01/31/2018 and we recheck ACT after 4 weeks. Patient verbally understand and will fill out and mail back ACT.     Jumana Martins MA

## 2018-02-21 NOTE — TELEPHONE ENCOUNTER
Called patient to see if he returned ACT. [facility] team number left.  Arianne FLORES CMA ([facility])

## 2018-04-16 ENCOUNTER — MYC REFILL (OUTPATIENT)
Dept: FAMILY MEDICINE | Facility: CLINIC | Age: 28
End: 2018-04-16

## 2018-04-16 DIAGNOSIS — J45.20 INTERMITTENT ASTHMA, UNCOMPLICATED: ICD-10-CM

## 2018-04-16 RX ORDER — ALBUTEROL SULFATE 90 UG/1
2 AEROSOL, METERED RESPIRATORY (INHALATION) EVERY 4 HOURS PRN
Qty: 1 INHALER | Refills: 1 | Status: SHIPPED | OUTPATIENT
Start: 2018-04-16 | End: 2019-02-12

## 2018-04-16 NOTE — TELEPHONE ENCOUNTER
Message from [place]:  Abigail Melendez RIGOBERTO Mon Apr 16, 2018 12:26 PM        ----- Message -----   From: Aleixs Ibrahim   Sent: 4/16/2018 12:23 PM   To:  Team [facility]  Subject: Medication Renewal Request     Original authorizing provider: MD Alexis LOPEZ would like a refill of the following medications:  albuterol (PROAIR HFA/PROVENTIL HFA/VENTOLIN HFA) 108 (90 BASE) MCG/ACT Inhaler [VERNA DSOUZA MD]    Preferred pharmacy: [id number] [address]    Comment:

## 2018-05-22 ENCOUNTER — TELEPHONE (OUTPATIENT)
Dept: FAMILY MEDICINE | Facility: CLINIC | Age: 28
End: 2018-05-22

## 2018-05-22 NOTE — LETTER
May 22, 2018      Alexis Ibrahim  [address] [place] [address]      Dear Alexis,     Your clinic record indicates that you are failing your ACT (or Asthma Control Test) which is   a survey tool that we use to measure the level of control of your asthma. A Score of 20 or Greater indicates that your symptoms are controlled and your medication are working as they should. Please complete the enclosed questionnaire and mail it back to us in the self-addressed stamped envelope.            If you have questions about this letter please contact your provider.     Sincerely,    Your [facility]

## 2018-05-22 NOTE — TELEPHONE ENCOUNTER
Panel Management Review      Patient has the following on his problem list:     Asthma review     ACT Total Scores 1/31/2018   ACT TOTAL SCORE -   ASTHMA ER VISITS -   ASTHMA HOSPITALIZATIONS -   ACT TOTAL SCORE (Goal Greater than or Equal to 20) 18   In the past 12 months, how many times did you visit the emergency room for your asthma without being admitted to the hospital? 0   In the past 12 months, how many times were you hospitalized overnight because of your asthma? 0      1. Is Asthma diagnosis on the Problem List? Yes    2. Is Asthma listed on Health Maintenance? Yes    3. Patient is due for:  Failed ACT      Composite cancer screening  Chart review shows that this patient is due/due soon for the following None  Summary:    Patient is due/failing the following:   ACT    Action needed:   Patient needs to do ACT.    Type of outreach:    Copy of ACT mailed to patient, will reach out in 5 days.   -Postponing message for 2 weeks and will outreach out to patient if ACT is not receive to clinic in 2 week.    Questions for provider review:    None                                                                                                                                    Jumana Martins MA       Chart routed to Care Team .

## 2018-09-17 ENCOUNTER — TELEPHONE (OUTPATIENT)
Dept: FAMILY MEDICINE | Facility: CLINIC | Age: 28
End: 2018-09-17

## 2018-09-17 NOTE — TELEPHONE ENCOUNTER
Panel Management Review      Patient has the following on his problem list:     Asthma review     ACT Total Scores 1/31/2018   ACT TOTAL SCORE -   ASTHMA ER VISITS -   ASTHMA HOSPITALIZATIONS -   ACT TOTAL SCORE (Goal Greater than or Equal to 20) 18   In the past 12 months, how many times did you visit the emergency room for your asthma without being admitted to the hospital? 0   In the past 12 months, how many times were you hospitalized overnight because of your asthma? 0      1. Is Asthma diagnosis on the Problem List? Yes    2. Is Asthma listed on Health Maintenance? Yes    3. Patient is due for:  ACT      Composite cancer screening  Chart review shows that this patient is due/due soon for the following None  Summary:    Patient is due/failing the following:   ACT    Action needed:   Patient needs to do ACT.    Type of outreach:    Copy of ACT mailed to patient, will reach out in 5 days.Postponing message for 2 weeks and will follow up wit patient if ACT is not received to clinic in 2 weeks.    Questions for provider review:    None                                                                                                                                    Jumana Martins MA       Chart routed to Care Team .

## 2018-09-17 NOTE — LETTER
September 17, 2018      Alexis Ibrahim  [address] [place] [address]      Dear Alexis,     Your clinic record indicates that you are failing your ACT (or Asthma Control Test) which is   a survey tool that we use to measure the level of control of your asthma. A Score of 20 or Greater indicates that your symptoms are controlled and your medication are working as they should. Please complete the enclosed questionnaire and mail it back to us in the self-addressed stamped envelope.        If you have questions about this letter please contact your provider.     Sincerely,    Your [facility]

## 2018-12-18 ENCOUNTER — TELEPHONE (OUTPATIENT)
Dept: FAMILY MEDICINE | Facility: CLINIC | Age: 28
End: 2018-12-18

## 2018-12-18 NOTE — TELEPHONE ENCOUNTER
Panel Management Review      Patient has the following on his problem list:     Asthma review     ACT Total Scores 1/31/2018   ACT TOTAL SCORE -   ASTHMA ER VISITS -   ASTHMA HOSPITALIZATIONS -   ACT TOTAL SCORE (Goal Greater than or Equal to 20) 18   In the past 12 months, how many times did you visit the emergency room for your asthma without being admitted to the hospital? 0   In the past 12 months, how many times were you hospitalized overnight because of your asthma? 0      1. Is Asthma diagnosis on the Problem List? Yes    2. Is Asthma listed on Health Maintenance? Yes    3. Patient is due for:  ACT and AAP      Composite cancer screening  Chart review shows that this patient is due/due soon for the following None  Summary:    Patient is due/failing the following:   AAP and ACT    Action needed:   Patient needs to do ACT.    Type of outreach:    Sent letter. and Copy of ACT mailed to patient, will reach out in 5 days.   -postponing message for 2 weeks and will reach out to patient in 2 weeks if ACT is not receive to clinic.    Questions for provider review:    None                                                                                                                                    Jumana Martins MA       Chart routed to none .

## 2018-12-18 NOTE — LETTER
December 18, 2018      Alexis Ibrahim  [address] [place] [id number]      Dear Alexis,     Your clinic record indicates that you are due for an asthma update. We have a survey tool called an ACT (or Asthma Control Test) we use to measure the level of control of your asthma. Please complete the enclosed questionnaire and mail it back to us in the self-addressed stamped envelope.     If you have questions about this letter please contact your provider.     Sincerely,    Your [facility]

## 2019-01-10 ENCOUNTER — TELEPHONE (OUTPATIENT)
Dept: FAMILY MEDICINE | Facility: CLINIC | Age: 29
End: 2019-01-10

## 2019-01-10 NOTE — TELEPHONE ENCOUNTER
Panel Management Review      Patient has the following on his problem list:     Asthma review     ACT Total Scores 1/31/2018   ACT TOTAL SCORE -   ASTHMA ER VISITS -   ASTHMA HOSPITALIZATIONS -   ACT TOTAL SCORE (Goal Greater than or Equal to 20) 18   In the past 12 months, how many times did you visit the emergency room for your asthma without being admitted to the hospital? 0   In the past 12 months, how many times were you hospitalized overnight because of your asthma? 0      1. Is Asthma diagnosis on the Problem List? Yes    2. Is Asthma listed on Health Maintenance? Yes    3. Patient is due for:  ACT and AAP      Composite cancer screening  Chart review shows that this patient is due/due soon for the following None  Summary:    Patient is due/failing the following:   AAP and ACT    Action needed:   Patient needs to do ACT.    Type of outreach:    none, outreached to patient on 12/18/2018    Questions for provider review:    None                                                                                                                                    Jumana Martins MA       Chart routed to none .

## 2019-02-12 ENCOUNTER — OFFICE VISIT (OUTPATIENT)
Dept: FAMILY MEDICINE | Facility: CLINIC | Age: 29
End: 2019-02-12
Payer: COMMERCIAL

## 2019-02-12 VITALS
WEIGHT: 222.4 LBS | SYSTOLIC BLOOD PRESSURE: 120 MMHG | TEMPERATURE: 97 F | DIASTOLIC BLOOD PRESSURE: 84 MMHG | RESPIRATION RATE: 16 BRPM | BODY MASS INDEX: 29.48 KG/M2 | OXYGEN SATURATION: 97 % | HEART RATE: 81 BPM | HEIGHT: 73 IN

## 2019-02-12 DIAGNOSIS — R21 RASH OF HANDS: ICD-10-CM

## 2019-02-12 DIAGNOSIS — Z00.00 ROUTINE HISTORY AND PHYSICAL EXAMINATION OF ADULT: Primary | ICD-10-CM

## 2019-02-12 DIAGNOSIS — H00.015 HORDEOLUM EXTERNUM OF LEFT LOWER EYELID: ICD-10-CM

## 2019-02-12 DIAGNOSIS — J45.20 INTERMITTENT ASTHMA, UNCOMPLICATED: ICD-10-CM

## 2019-02-12 DIAGNOSIS — K60.2 ANAL FISSURE: ICD-10-CM

## 2019-02-12 PROCEDURE — 99395 PREV VISIT EST AGE 18-39: CPT | Performed by: FAMILY MEDICINE

## 2019-02-12 PROCEDURE — 99213 OFFICE O/P EST LOW 20 MIN: CPT | Mod: 25 | Performed by: FAMILY MEDICINE

## 2019-02-12 RX ORDER — ALBUTEROL SULFATE 90 UG/1
2 AEROSOL, METERED RESPIRATORY (INHALATION) EVERY 6 HOURS PRN
Qty: 1 INHALER | Refills: 3 | Status: SHIPPED | OUTPATIENT
Start: 2019-02-12 | End: 2019-10-23

## 2019-02-12 ASSESSMENT — ENCOUNTER SYMPTOMS
DIARRHEA: 0
SORE THROAT: 0
HEADACHES: 0
FREQUENCY: 1
JOINT SWELLING: 0
CONSTIPATION: 0
MYALGIAS: 0
ABDOMINAL PAIN: 0
EYE PAIN: 0
HEMATURIA: 0
ARTHRALGIAS: 0
WEAKNESS: 0
FEVER: 0
PALPITATIONS: 0
CHILLS: 0
DIZZINESS: 0
HEARTBURN: 0
NERVOUS/ANXIOUS: 0
HEMATOCHEZIA: 1
DYSURIA: 0
COUGH: 0
NAUSEA: 0
SHORTNESS OF BREATH: 0
PARESTHESIAS: 0

## 2019-02-12 ASSESSMENT — MIFFLIN-ST. JEOR: SCORE: 2044.42

## 2019-02-12 NOTE — PROGRESS NOTES
SUBJECTIVE:   CC: Alexis Ibrahim is an 28 year old male who presents for preventative health visit.     Physical   Annual:     Getting at least 3 servings of Calcium per day:  Yes    Bi-annual eye exam:  NO    Dental care twice a year:  Yes    Sleep apnea or symptoms of sleep apnea:  None    Diet:  Regular (no restrictions)    Frequency of exercise:  4-5 days/week    Duration of exercise:  30-45 minutes    Taking medications regularly:  Yes    Medication side effects:  Not applicable    Additional concerns today:  Yes    PHQ-2 Total Score: 1      PROBLEMS TO ADD ON...  Asthma Follow-Up   Asthma acts up in the winter using it once daily at this time.  Was ACT completed today?    Yes    ACT Total Scores 2/12/2019   ACT TOTAL SCORE -   ASTHMA ER VISITS -   ASTHMA HOSPITALIZATIONS -   ACT TOTAL SCORE (Goal Greater than or Equal to 20) 18   In the past 12 months, how many times did you visit the emergency room for your asthma without being admitted to the hospital? 0   In the past 12 months, how many times were you hospitalized overnight because of your asthma? 0       Recent asthma triggers that patient is dealing with: animal dander and cold air     Stye: Left eye x 6 weeks, did warm compresses improved but not resolved.     Constipation: Some blood in stool with wiping; stools are hard.   Rash in Hands: tried different creams without success.     Screening from work: LDL and blood sugar>   Flu shot: Does not do.    Today's PHQ-2 Score:   PHQ-2 ( 1999 Pf[employer]) 2/12/2019   Q1: Little interest or pleasure in doing things 1   Q2: Feeling down, depressed or hopeless 0   PHQ-2 Score 1   Q1: Little interest or pleasure in doing things Several days   Q2: Feeling down, depressed or hopeless Not at all   PHQ-2 Score 1       Abuse: Current or Past(Physical, Sexual or Emotional)- No  Do you feel safe in your environment? Yes    Social History     Tobacco Use     Smoking status: Never Smoker     Smokeless tobacco: Never Used  "  Substance Use Topics     Alcohol use: Yes     Alcohol/week: 0.0 oz     Alcohol Use 2/12/2019   If you drink alcohol do you typically have greater than 3 drinks per day OR greater than 7 drinks per week? No   No flowsheet data found.    Last PSA: No results found for: PSA    Reviewed orders with patient. Reviewed health maintenance and updated orders accordingly - Yes  Patient Active Problem List   Diagnosis     CARDIOVASCULAR SCREENING; LDL GOAL LESS THAN 160     Intermittent asthma, uncomplicated     Past Surgical History:   Procedure Laterality Date     none         Social History     Tobacco Use     Smoking status: Never Smoker     Smokeless tobacco: Never Used   Substance Use Topics     Alcohol use: Yes     Alcohol/week: 0.0 oz     Family History   Problem Relation Age of Onset     Unknown/Adopted Mother      Unknown/Adopted Father          Reviewed and updated as needed this visit by Provider        Review of Systems   Constitutional: Negative for chills and fever.   HENT: Negative for congestion, ear pain, hearing loss and sore throat.    Eyes: Negative for pain and visual disturbance.   Respiratory: Negative for cough and shortness of breath.    Cardiovascular: Negative for chest pain, palpitations and peripheral edema.   Gastrointestinal: Positive for hematochezia. Negative for abdominal pain, constipation, diarrhea, heartburn and nausea.   Genitourinary: Positive for frequency. Negative for discharge, dysuria, genital sores, hematuria, impotence and urgency.   Musculoskeletal: Negative for arthralgias, joint swelling and myalgias.   Skin: Negative for rash.   Neurological: Negative for dizziness, weakness, headaches and paresthesias.   Psychiatric/Behavioral: Negative for mood changes. The patient is not nervous/anxious.      OBJECTIVE:   /84   Pulse 81   Temp 97  F (36.1  C) (Oral)   Resp 16   Ht 1.865 m (6' 1.43\")   Wt 100.9 kg (222 lb 6.4 oz)   SpO2 97%   BMI 29.00 kg/m      Physical " "Exam  GENERAL: healthy, alert and no distress  EYES: Eyes grossly normal to inspection, PERRL and conjunctivae and sclerae normal  HENT: ear canals and TM's normal, nose and mouth without ulcers or lesions  NECK: no adenopathy and thyroid normal to palpation  RESP: lungs clear to auscultation - no rales, rhonchi or wheezes  CV: regular rate and rhythm, normal S1 S2, no S3 or S4, no murmur, click or rub, no peripheral edema and peripheral pulses strong  ABDOMEN: soft, nontender, no hepatosplenomegaly, no masses and bowel sounds normal  RECTUM: Fissure at 12 oclock and residual hemorrhoid tag at 5 pm.  MS: no gross musculoskeletal defects noted, no edema  NEURO: Normal strength and tone, mentation intact and speech normal  PSYCH: mentation appears normal, affect normal/bright    Diagnostic Test Results:  none     ASSESSMENT/PLAN:   Alexis was seen today for physical and health maintenance.    Diagnoses and all orders for this visit:    Routine history and physical examination of adult    Intermittent asthma, uncomplicated  -     albuterol (PROAIR HFA/PROVENTIL HFA/VENTOLIN HFA) 108 (90 Base) MCG/ACT inhaler; Inhale 2 puffs into the lungs every 6 hours as needed for shortness of breath / dyspnea    Hordeolum externum of left lower eyelid        -    Not infectious, may take a little longer to resolve.    Rash of hands             Differentials: Eczema, Psoriasis; due to persistence` will have dermatology evaluation.  -     DERMATOLOGY REFERRAL    Anal fissure  -     nitroGLYcerin (NITRO-BID) 2 % OINT ointment; Apply about1 inch every 12 hours for up to 3 weeks    COUNSELING:   Reviewed preventive health counseling, as reflected in patient instructions       Regular exercise       Healthy diet/nutrition    BP Readings from Last 1 Encounters:   02/12/19 120/84     Estimated body mass index is 29 kg/m  as calculated from the following:    Height as of this encounter: 1.865 m (6' 1.43\").    Weight as of this encounter: " 100.9 kg (222 lb 6.4 oz).    BP Screening:   Last 3 BP Readings:    BP Readings from Last 3 Encounters:   02/12/19 120/84   01/31/18 120/72   03/14/17 132/64       The following was recommended to the patient:  Re-screen BP within a year and recommended lifestyle modifications  Weight management plan: Discussed healthy diet and exercise guidelines     reports that  has never smoked. he has never used smokeless tobacco.      Counseling Resources:  ATP IV Guidelines  Pooled Cohorts Equation Calculator  FRAX Risk Assessment  ICSI Preventive Guidelines  Dietary Guidelines for Americans, 2010  USDA's MyPlate  ASA Prophylaxis  Lung CA Screening    Jonnie Davis MD  [facility]

## 2019-02-12 NOTE — LETTER
My Asthma Action Plan  Name: Alexis Ibrahim   YOB: 1990  Date: 2/12/2019   My doctor: Jonnie Davis MD   My clinic: [facility]        My Control Medicine: None  My Rescue Medicine: Albuterol (Proair/Ventolin/Proventil) inhaler .   My Asthma Severity: intermittent  Avoid your asthma triggers: animal dander and cold air  animal dander  cold air            GREEN ZONE   Good Control    I feel good    No cough or wheeze    Can work, sleep and play without asthma symptoms       Take your asthma control medicine every day.     1. If exercise triggers your asthma, take your rescue medication    15 minutes before exercise or sports, and    During exercise if you have asthma symptoms  2. Spacer to use with inhaler: If you have a spacer, make sure to use it with your inhaler             YELLOW ZONE Getting Worse  I have ANY of these:    I do not feel good    Cough or wheeze    Chest feels tight    Wake up at night   1. Keep taking your Green Zone medications  2. Start taking your rescue medicine:    every 20 minutes for up to 1 hour. Then every 4 hours for 24-48 hours.  3. If you stay in the Yellow Zone for more than 12-24 hours, contact your doctor.  4. If you do not return to the Green Zone in 12-24 hours or you get worse, start taking your oral steroid medicine if prescribed by your provider.           RED ZONE Medical Alert - Get Help  I have ANY of these:    I feel awful    Medicine is not helping    Breathing getting harder    Trouble walking or talking    Nose opens wide to breathe       1. Take your rescue medicine NOW  2. If your provider has prescribed an oral steroid medicine, start taking it NOW  3. Call your doctor NOW  4. If you are still in the Red Zone after 20 minutes and you have not reached your doctor:    Take your rescue medicine again and    Call 911 or go to the emergency room right away    See your regular doctor within 2 weeks of an Emergency Room or Urgent Care  visit for follow-up treatment.          Annual Reminders:  Meet with Asthma Educator,  Flu Shot in the Fall, consider Pneumonia Vaccination for patients with asthma (aged 19 and older).    Pharmacy:    [id number] - [place], [place] - [address] [place] [address] [id number] - [place], [place] - [address]                      Asthma Triggers  How To Control Things That Make Your Asthma Worse    Triggers are things that make your asthma worse.  Look at the list below to help you find your triggers and what you can do about them.  You can help prevent asthma flare-ups by staying away from your triggers.      Trigger                                                          What you can do   Cigarette Smoke  Tobacco smoke can make asthma worse. Do not allow smoking in your home, car or around you.  Be sure no one smokes at a child s day care or school.  If you smoke, ask your health care provider for ways to help you quit.  Ask family members to quit too.  Ask your health care provider for a referral to Quit Plan to help you quit smoking, or call 5-190-481-PLAN.     Colds, Flu, Bronchitis  These are common triggers of asthma. Wash your hands often.  Don t touch your eyes, nose or mouth.  Get a flu shot every year.     Dust Mites  These are tiny bugs that live in cloth or carpet. They are too small to see. Wash sheets and blankets in hot water every week.   Encase pillows and mattress in dust mite proof covers.  Avoid having carpet if you can. If you have carpet, vacuum weekly.   Use a dust mask and HEPA vacuum.   Pollen and Outdoor Mold  Some people are allergic to trees, grass, or weed pollen, or molds. Try to keep your windows closed.  Limit time out doors when pollen count is high.   Ask you health care provider about taking medicine during allergy season.     Animal Dander  Some people are allergic to skin flakes, urine or saliva from pets with fur or feathers. Keep pets with fur or feathers out of your home.     If you can t keep the pet outdoors, then keep the pet out of your bedroom.  Keep the bedroom door closed.  Keep pets off cloth furniture and away from stuffed toys.     Mice, Rats, and Cockroaches  Some people are allergic to the waste from these pests.   Cover food and garbage.  Clean up spills and food crumbs.  Store grease in the refrigerator.   Keep food out of the bedroom.   Indoor Mold  This can be a trigger if your home has high moisture. Fix leaking faucets, pipes, or other sources of water.   Clean moldy surfaces.  Dehumidify basement if it is damp and smelly.   Smoke, Strong Odors, and Sprays  These can reduce air quality. Stay away from strong odors and sprays, such as perfume, powder, hair spray, paints, smoke incense, paint, cleaning products, candles and new carpet.   Exercise or Sports  Some people with asthma have this trigger. Be active!  Ask your doctor about taking medicine before sports or exercise to prevent symptoms.    Warm up for 5-10 minutes before and after sports or exercise.     Other Triggers of Asthma  Cold air:  Cover your nose and mouth with a scarf.  Sometimes laughing or crying can be a trigger.  Some medicines and food can trigger asthma.

## 2019-02-12 NOTE — PATIENT INSTRUCTIONS
Preventive Health Recommendations  Male Ages 26 - 39    Yearly exam:             See your health care provider every year in order to  o   Review health changes.   o   Discuss preventive care.    o   Review your medicines if your doctor has prescribed any.    You should be tested each year for STDs (sexually transmitted diseases), if you re at risk.     After age 35, talk to your provider about cholesterol testing. If you are at risk for heart disease, have your cholesterol tested at least every 5 years.     If you are at risk for diabetes, you should have a diabetes test (fasting glucose).  Shots: Get a flu shot each year. Get a tetanus shot every 10 years.     Nutrition:    Eat at least 5 servings of fruits and vegetables daily.     Eat whole-grain bread, whole-wheat pasta and brown rice instead of white grains and rice.     Get adequate Calcium and Vitamin D.     Lifestyle    Exercise for at least 150 minutes a week (30 minutes a day, 5 days a week). This will help you control your weight and prevent disease.     Limit alcohol to one drink per day.     No smoking.     Wear sunscreen to prevent skin cancer.     See your dentist every six months for an exam and cleaning.     Patient Education     Chalazion    A chalazion is a blocked, swollen oil gland in the eyelid. The eyelids have oil glands that lubricate the inside of the lids. If a gland becomes blocked, the oil builds up and causes the skin to swell.  A chalazion can take several weeks to grow. It can vary in size. It may appear on the inside or outside of the lid. In most cases, it occurs on the upper lid. The skin may be a normal color or may be red. A chalazion is usually not painful. But it can cause mild pain, soreness, sensitivity to light, eye discharge, and increased tearing.  A chalazion often lasts from a few weeks to a month. It often goes away on its own. A chalazion can be mistaken for a sty (infection of an oil gland) because they both appear  on the eyelid.  Why a chalazion forms  It s often unclear why a chalazion appears. But a chalazion can develop when you have any of the following conditions:    Chronic blepharitis, when eyelids become irritated    Acne rosacea    Seborrhea    Tuberculosis    Viral infection  Home care  If your healthcare provider finds that a chalazion is infected, he or she may prescribe an antibiotic drop or ointment. Use the medicine as directed.    Wash your hands carefully with soap and warm water before and after caring for your eye. This is to help prevent infection.    Apply a warm, moist towel or compress for 10 to 15 minutes, 3 to 4 times a day. This will reduce the swelling and soften the hardened oils blocking the duct.    Massage the area gently after applying the warm compress to help drain the chalazion. Or follow your healthcare provider s directions.    Don t try to pop or squeeze the chalazion.    Don t wear eye makeup until the chalazion has healed. Or follow your healthcare provider s directions.    Don t wear contact lenses until the chalazion has healed. Or follow your healthcare provider s directions.    Once a day, with eyes closed, clean your eyelids with baby shampoo or a moist eyelid cleansing wipe. This is to help reduce clogging of the duct, as well as help prevent a chalazion from returning. Ask your healthcare provider about products to clean your eyelids.  Follow-up care  Follow up with your healthcare provider, or as advised. If the chalazion does not heal in 4 weeks, you may be referred to a healthcare provider who specializes in eye care (an optometrist or ophthalmologist) for further evaluation and treatment. You may also be referred to an eye specialist if you have a large chalazion.  When to seek medical advice  Call your healthcare provider right away if any of these occur:    Chalazion returns to the same area repeatedly    Existing symptoms (such as pain, warmth, redness, and drainage) get  worse    New symptoms appear, such as eye pain, warmth or redness around the eye, eye drainage, or both the upper and lower lids of the same eye swell    You have visual changes or blurred vision    You have a headache that persists    You have a fever of 100.4 F (38 C) or higher, or as directed by your healthcare provider  Date Last Reviewed: 3/1/2018    5257-2471 [employer]. [address] [place] [address], [place] [address]. All rights reserved. This information is not intended as a substitute for professional medical care. Always follow your healthcare professional's instructions.

## 2019-02-13 ASSESSMENT — ASTHMA QUESTIONNAIRES: ACT_TOTALSCORE: 18

## 2019-03-19 ENCOUNTER — OFFICE VISIT (OUTPATIENT)
Dept: DERMATOLOGY | Facility: CLINIC | Age: 29
End: 2019-03-19
Payer: COMMERCIAL

## 2019-03-19 DIAGNOSIS — R21 RASH/SKIN ERUPTION: Primary | ICD-10-CM

## 2019-03-19 PROCEDURE — 11104 PUNCH BX SKIN SINGLE LESION: CPT | Performed by: DERMATOLOGY

## 2019-03-19 PROCEDURE — 99202 OFFICE O/P NEW SF 15 MIN: CPT | Mod: 25 | Performed by: DERMATOLOGY

## 2019-03-19 PROCEDURE — 88312 SPECIAL STAINS GROUP 1: CPT | Mod: TC | Performed by: DERMATOLOGY

## 2019-03-19 PROCEDURE — 88305 TISSUE EXAM BY PATHOLOGIST: CPT | Mod: TC | Performed by: DERMATOLOGY

## 2019-03-19 ASSESSMENT — PAIN SCALES - GENERAL: PAINLEVEL: NO PAIN (0)

## 2019-03-19 NOTE — PROGRESS NOTES
[facility] Dermatology Note      Dermatology Problem List:  1. Pink scaly plaques on MCPs and PIPs   -s/p biopsy 3/19/19    Encounter Date: Mar 19, 2019    CC:  Chief Complaint   Patient presents with     Derm Problem     has used betamethasone - help dryness, but not redness and too expensive - has not used in the last 6 months; also has ued triamcinolone 0.1% ointment with same results         History of Present Illness:  Mr. Alexis Ibrahim is a 28 year old male who presents in self referral for a rash on his hands. It is on the knuckles and finger tips and it is really dry and cracked red skin. He remembers it being severe in high school as well. He does not recall a history of eczema as a child. He has used augmented betamethasone which has helped with the dryness, but not the redness. The medication was too expensive, so he has not used it in 6 months. He has also used triamcinolone 0.1% ointment with the same result. He plays basketball in the winter, which exacerbates the problem. Patient denies a history of muscle weakness. For a moisturizer, he uses Vaseline lotion. He uses Admitly brand soaps. No other concerns addressed today.      Past Medical History:   Patient Active Problem List   Diagnosis     CARDIOVASCULAR SCREENING; LDL GOAL LESS THAN 160     Intermittent asthma, uncomplicated     Past Medical History:   Diagnosis Date     CARDIOVASCULAR SCREENING; LDL GOAL LESS THAN 160 5/2/2011     Emesis     NOCTURNAL     Past Surgical History:   Procedure Laterality Date     none         Social History:  Patient reports that  has never smoked. he has never used smokeless tobacco. He reports that he drinks alcohol. He reports that he does not use drugs. Pt plays basketball and works in IT.      Family History:  Family History   Problem Relation Age of Onset     Unknown/Adopted Mother      Unknown/Adopted Father        Medications:  Current Outpatient Medications   Medication Sig Dispense  Refill     albuterol (PROAIR HFA/PROVENTIL HFA/VENTOLIN HFA) 108 (90 Base) MCG/ACT inhaler Inhale 2 puffs into the lungs every 6 hours as needed for shortness of breath / dyspnea 1 Inhaler 3     augmented betamethasone dipropionate (DIPROLENE-AF) 0.05 % ointment Apply sparingly to affected area once daily as needed.  Do not apply to face. 90 g 10     doxycycline (VIBRAMYCIN) 100 MG capsule Take 1 capsule (100 mg) by mouth 2 times daily (Patient not taking: Reported on 2/12/2019) 30 capsule 0     fexofenadine (ALLEGRA) 180 MG tablet Take by mouth as needed       hydrOXYzine (ATARAX) 25 MG tablet Take 1-2 tablets (25-50 mg) by mouth every 6 hours as needed for anxiety 60 tablet 1     nitroGLYcerin (NITRO-BID) 2 % OINT ointment Apply about1 inch every 12 hours for up to 3 weeks 30 g 1       Allergies   Allergen Reactions     Cat Hair Extract Shortness Of Breath     Penicillins Nausea and Vomiting       Review of Systems:  -Constitutional: Patient is otherwise feeling well, in usual state of health.   -Skin: As above in HPI. No additional skin concerns.    Physical exam:  Vitals: There were no vitals taken for this visit.  GEN: This is a well developed, well-nourished male in no acute distress, in a pleasant mood.    SKIN: Sun-exposed skin, which includes the head/face, neck, both arms, digits, and/or nails was examined.   - pink scaly plaques over MCP and PIP of bilateral hands  - fssures at finger tips but no vesicles or bilsters on the palmar hands  - No other lesions of concern on areas examined.             Impression/Plan:  1. Pink scaly plaques over MCPs and PIPs. Suspect eczema as patient has both seasonal allergies and asthma, but not the classic distribution. Does not have the violaceous appearance of DM, but want to rule this out give location of eruption. Psoriasis a possibility but unlikely to affect only the hands. Ddx: hand eczema vs dermatomyositis vs psoriasis.    Punch biopsy:  After discussion of  benefits and risks including but not limited to bleeding/bruising, pain/swelling, infection, scar, incomplete removal, nerve damage/numbness, recurrence, and non-diagnostic biopsy, written consent, verbal consent and photographs were obtained. Time-out was performed. The area was cleaned with isopropyl alcohol. 0.5mL of 1% lidocaine with 1:100,000 epinephrine was injected to obtain adequate anesthesia of the lesion on the right second finger.  A 4 mm punch biopsy was performed. 4-0 ethilon sutures were utilized to approximate the epidermal edges. White petroleum jelly/Vaseline and a bandage was applied to the wound. Explicit verbal and written wound care instructions were provided. The patient left the Dermatology Clinic in good condition. The patient was counseled to follow up for suture removal in approximately 14 days.    In the meantime recommended cream based, like Vanicream or CeraVe moisturizer daily on the hands. At night recommended soak with Vaseline and white cotton gloves over top.     Recommended gentle skin care, with Cetaphil or Vanicream gentle facial cleanser as a daily soap.       Follow-up pending biopsy results.       Staff Involved:  Scribe/Staff    Scribe Disclosure  I, Shantell Akbar, am serving as a scribe to document services personally performed by Dr. Chanell Styles MD, based on data collection and the provider's statements to me.     Provider Disclosure:   The documentation recorded by the scribe accurately reflects the services I personally performed and the decisions made by me.    Chanell Styles MD    Department of Dermatology  [facility]: Phone: [phone], Fax:[phone]  [facility]: Phone: [phone], Fax: [phone]

## 2019-03-19 NOTE — NURSING NOTE
The following medication was given:     MEDICATION:  Lidocaine with epinephrine 1% 1:[id number]  ROUTE: SQ  SITE: see procedure note  DOSE: 1 CC  LOT #: -[id number]  : Edmund  EXPIRATION DATE: JAN-  NDC#: 8530-3884-26   Was there drug waste? No  Multi-dose vial: Yes    Isaura Perry LPN  March 19, 2019

## 2019-03-19 NOTE — NURSING NOTE
Alexis Ibrahim's goals for this visit include:   Chief Complaint   Patient presents with     Derm Problem     has used betamethasone - help dryness, but not redness and too expensive - has not used in the last 6 months; also has ued triamcinolone 0.1% ointment with same results       He requests these members of his care team be copied on today's visit information: NO    PCP: Colleen Cervantes    Referring Provider:  No referring provider defined for this encounter.    There were no vitals taken for this visit.    Do you need any medication refills at today's visit? NO    Jennifer Silva, [facility]

## 2019-03-22 LAB — COPATH REPORT: NORMAL

## 2019-03-26 ENCOUNTER — TELEPHONE (OUTPATIENT)
Dept: DERMATOLOGY | Facility: CLINIC | Age: 29
End: 2019-03-26

## 2019-03-26 DIAGNOSIS — L30.9 ECZEMA: Primary | ICD-10-CM

## 2019-03-26 RX ORDER — CLOBETASOL PROPIONATE 0.5 MG/G
OINTMENT TOPICAL
Qty: 30 G | Refills: 2 | Status: SHIPPED | OUTPATIENT
Start: 2019-03-26 | End: 2020-01-06

## 2019-03-26 NOTE — TELEPHONE ENCOUNTER
Notes recorded by Jessica Molina RN on 3/26/2019 at 8:21 AM CDT  Results reviewed with Duc.  He verbalized understanding and agreed with the plan.  He has Vanicream and white cotton gloves already.  Rx sent to [facility].  Jessica Molina RN    ------    Notes recorded by Chanell Galeas MD on 3/26/2019 at 7:24 AM CDT  Please call patient and let him know biopsy results and treatment plan below:    Biopsy most consistent with eczema.    Treatment for eczema is with frequent cream based moisturizers, use of gentle non-soap cleansers, and topical steroids as we discussed at the visit. He should aim to apply a cream based moisturizer morning, night, and each time his hands get wet throughout the day. When the hands are flared, he should soak them in warm water for 10 minutes prior to bed. After soaking, applying topical steroid followed by moisturizer (either cream based or Vaseline/Aquaphor) and then white cotton gloves over top. This will intensify the effects of the steroid.     RN: Please send rx for clobetasol 0.05% ointment (30g, 2 refills).    Please have the patient let us know if this is too expense so we can try another medication.    Thanks,    Chanell Galeas MD    Department of Dermatology  [facility]: Phone: [phone], Fax:[phone]  [facility]: Phone: [phone], Fax: [phone]     Dermatological path order and indications   Order: [id number]   Status:  Final result   Visible to patient:  Yes (MyChart) Dx:  Rash/skin eruption   Component 7d ago   Copath Report Patient Name: CAROLINA FLOYD   MR#: [id number]   Specimen #: [id number]   Collected: 3/19/2019   Received: 3/20/2019   Reported: 3/22/2019 10:15   Ordering Phy(s): CHANELL GALEAS     For improved result formatting, select 'View Enhanced Report Format' under    Linked Documents section.     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

## 2019-04-26 ENCOUNTER — TELEPHONE (OUTPATIENT)
Dept: FAMILY MEDICINE | Facility: CLINIC | Age: 29
End: 2019-04-26

## 2019-04-26 NOTE — TELEPHONE ENCOUNTER
Panel Management Review      Patient has the following on his problem list:     Asthma review     ACT Total Scores 2/12/2019   ACT TOTAL SCORE -   ASTHMA ER VISITS -   ASTHMA HOSPITALIZATIONS -   ACT TOTAL SCORE (Goal Greater than or Equal to 20) 18   In the past 12 months, how many times did you visit the emergency room for your asthma without being admitted to the hospital? 0   In the past 12 months, how many times were you hospitalized overnight because of your asthma? 0      1. Is Asthma diagnosis on the Problem List? Yes    2. Is Asthma listed on Health Maintenance? Yes    3. Patient is due for:  Failed ACT      Composite cancer screening  Chart review shows that this patient is due/due soon for the following None  Summary:    Patient is due/failing the following:   ACT    Action needed:   Patient needs to do ACT.    Type of outreach:    Sent letter. and Copy of ACT mailed to patient, will reach out in 5 days.    Questions for provider review:    None                                                                                                                                    Jumana Martins MA       Chart routed to Care Team .

## 2019-04-26 NOTE — LETTER
April 26, 2019      Alexis Ibrahim  [address] [facility] [address] [place] [id number]      Dear Alexis,     Your clinic record indicates that you are failing your ACT (or Asthma Control Test) which is   a survey tool that we use to measure the level of control of your asthma. A Score of 20 or Greater indicates that your symptoms are controlled and your medication are working as they should. Please complete the enclosed questionnaire and mail it back to us in the self-addressed stamped envelope.          If you have questions about this letter please contact your provider.     Sincerely,    Your [facility]

## 2019-05-10 NOTE — TELEPHONE ENCOUNTER
Called patient and left VM to call patient. Okay to speak to anyone on the purple team.  Arianne FLORES CMA ([facility])

## 2019-05-15 NOTE — TELEPHONE ENCOUNTER
Multiple attempts made to contact patient. Closing encounter.  Arianne FLORES CMA ([facility])

## 2019-09-30 ENCOUNTER — TELEPHONE (OUTPATIENT)
Dept: FAMILY MEDICINE | Facility: CLINIC | Age: 29
End: 2019-09-30

## 2019-09-30 NOTE — LETTER
September 30, 2019      Alexis Ibrahim  [address] [place] [id number]      Dear Alexis,     Your clinic record indicates that you are due for an asthma update. We have a survey tool called an ACT (or Asthma Control Test) we use to measure the level of control of your asthma. Please complete the enclosed questionnaire and mail it back to us in the self-addressed stamped envelope.     If you have questions about this letter please contact your provider.     Sincerely,      Your [facility]

## 2019-09-30 NOTE — TELEPHONE ENCOUNTER
Panel Management Review      Patient has the following on his problem list:     Asthma review     ACT Total Scores 2/12/2019   ACT TOTAL SCORE -   ASTHMA ER VISITS -   ASTHMA HOSPITALIZATIONS -   ACT TOTAL SCORE (Goal Greater than or Equal to 20) 18   In the past 12 months, how many times did you visit the emergency room for your asthma without being admitted to the hospital? 0   In the past 12 months, how many times were you hospitalized overnight because of your asthma? 0      1. Is Asthma diagnosis on the Problem List? Yes    2. Is Asthma listed on Health Maintenance? Yes    3. Patient is due for:  ACT      Composite cancer screening  Chart review shows that this patient is due/due soon for the following None  Summary:    Patient is due/failing the following:   ACT    Action needed:   Patient needs to do ACT.    Type of outreach:    Copy of ACT mailed to patient, will reach out in 5 days.    Questions for provider review:    None                                                                                                                                    Aviva Robbins MA       Chart routed to Care Team .

## 2019-10-23 ENCOUNTER — MYC REFILL (OUTPATIENT)
Dept: FAMILY MEDICINE | Facility: CLINIC | Age: 29
End: 2019-10-23

## 2019-10-23 DIAGNOSIS — J45.20 INTERMITTENT ASTHMA, UNCOMPLICATED: ICD-10-CM

## 2019-10-25 RX ORDER — ALBUTEROL SULFATE 90 UG/1
2 AEROSOL, METERED RESPIRATORY (INHALATION) EVERY 6 HOURS PRN
Qty: 1 INHALER | Refills: 0 | Status: SHIPPED | OUTPATIENT
Start: 2019-10-25 | End: 2020-01-06

## 2019-10-25 NOTE — TELEPHONE ENCOUNTER
Schedule follow-up appointment  Signed Prescriptions:                        Disp   Refills    albuterol (PROAIR HFA/PROVENTIL HFA/VENTOL*1 Inha*0        Sig: Inhale 2 puffs into the lungs every 6 hours as needed           for shortness of breath / dyspnea . No further           refills until follow-up appointment  Authorizing Provider: ROLANDO MCCLELLAN

## 2019-10-25 NOTE — TELEPHONE ENCOUNTER
"Routing refill request to provider for review/approval because:  Labs out of range:  ACT    Requested Prescriptions   Pending Prescriptions Disp Refills     albuterol (PROAIR HFA/PROVENTIL HFA/VENTOLIN HFA) 108 (90 Base) MCG/ACT inhaler 1 Inhaler 3     Sig: Inhale 2 puffs into the lungs every 6 hours as needed for shortness of breath / dyspnea       Asthma Maintenance Inhalers - Anticholinergics Failed - 10/23/2019  8:11 AM        Failed - Asthma control assessment score within normal limits in last 6 months     Please review ACT score.           Failed - Recent (6 mo) or future (30 days) visit within the authorizing provider's specialty     Patient had office visit in the last 6 months or has a visit in the next 30 days with authorizing provider or within the authorizing provider's specialty.  See \"Patient Info\" tab in inbasket, or \"Choose Columns\" in Meds & Orders section of the refill encounter.            Passed - Patient is age 12 years or older        Passed - Medication is active on med list        Emily Pacheco RN  "

## 2019-10-25 NOTE — TELEPHONE ENCOUNTER
Left patient VM to return call to purple team to schedule an OV  Maik Martins CMA on 10/25/2019 at 1:47 PM

## 2019-12-06 ENCOUNTER — TELEPHONE (OUTPATIENT)
Dept: FAMILY MEDICINE | Facility: CLINIC | Age: 29
End: 2019-12-06

## 2019-12-06 NOTE — TELEPHONE ENCOUNTER
Panel Management Review      Patient has the following on his problem list:     Asthma review     ACT Total Scores 2/12/2019   ACT TOTAL SCORE -   ASTHMA ER VISITS -   ASTHMA HOSPITALIZATIONS -   ACT TOTAL SCORE (Goal Greater than or Equal to 20) 18   In the past 12 months, how many times did you visit the emergency room for your asthma without being admitted to the hospital? 0   In the past 12 months, how many times were you hospitalized overnight because of your asthma? 0      1. Is Asthma diagnosis on the Problem List? Yes    2. Is Asthma listed on Health Maintenance? Yes    3. Patient is due for:  ACT      Composite cancer screening  Chart review shows that this patient is due/due soon for the following None  Summary:    Patient is due/failing the following:   ACT    Action needed:   Patient needs to do ACT.    Type of outreach:    Sent letter.    Questions for provider review:    None                                                                                                                                    Margaux Green [facility]         Chart routed to selene   .

## 2019-12-06 NOTE — LETTER
December 6, 2019      Alexis Ibrahim  [address] [facility] [address] [place] [id number]      Dear Alexis,     Your clinic record indicates that you are due for an asthma update. We have a survey tool called an ACT (or Asthma Control Test) we use to measure the level of control of your asthma. Please complete the enclosed questionnaire and mail it back to us in the self-addressed stamped envelope.     If you have questions about this letter please contact your provider.     Sincerely,    Your [facility]

## 2020-01-02 ENCOUNTER — APPOINTMENT (OUTPATIENT)
Dept: GENERAL RADIOLOGY | Facility: CLINIC | Age: 30
End: 2020-01-02
Attending: EMERGENCY MEDICINE
Payer: COMMERCIAL

## 2020-01-02 ENCOUNTER — HOSPITAL ENCOUNTER (EMERGENCY)
Facility: CLINIC | Age: 30
Discharge: HOME | End: 2020-01-02
Attending: EMERGENCY MEDICINE | Admitting: EMERGENCY MEDICINE
Payer: COMMERCIAL

## 2020-01-02 VITALS
DIASTOLIC BLOOD PRESSURE: 73 MMHG | HEIGHT: 73 IN | TEMPERATURE: 97.4 F | BODY MASS INDEX: 27.17 KG/M2 | WEIGHT: 205 LBS | SYSTOLIC BLOOD PRESSURE: 131 MMHG | OXYGEN SATURATION: 98 % | HEART RATE: 86 BPM | RESPIRATION RATE: 16 BRPM

## 2020-01-02 DIAGNOSIS — R07.9 CHEST PAIN, UNSPECIFIED TYPE: ICD-10-CM

## 2020-01-02 DIAGNOSIS — G58.8 OTHER MONONEUROPATHY: ICD-10-CM

## 2020-01-02 LAB
ANION GAP SERPL CALCULATED.3IONS-SCNC: 4 MMOL/L (ref 3–14)
BASOPHILS # BLD AUTO: 0.1 10E9/L (ref 0–0.2)
BASOPHILS NFR BLD AUTO: 0.8 %
BUN SERPL-MCNC: 8 MG/DL (ref 7–30)
CALCIUM SERPL-MCNC: 9.4 MG/DL (ref 8.5–10.1)
CHLORIDE SERPL-SCNC: 109 MMOL/L (ref 94–109)
CO2 SERPL-SCNC: 24 MMOL/L (ref 20–32)
CREAT SERPL-MCNC: 0.73 MG/DL (ref 0.66–1.25)
DIFFERENTIAL METHOD BLD: NORMAL
EOSINOPHIL # BLD AUTO: 0.2 10E9/L (ref 0–0.7)
EOSINOPHIL NFR BLD AUTO: 2.3 %
ERYTHROCYTE [DISTWIDTH] IN BLOOD BY AUTOMATED COUNT: 11.9 % (ref 10–15)
GFR SERPL CREATININE-BSD FRML MDRD: >90 ML/MIN/{1.73_M2}
GLUCOSE SERPL-MCNC: 111 MG/DL (ref 70–99)
HCT VFR BLD AUTO: 44.9 % (ref 40–53)
HGB BLD-MCNC: 16.1 G/DL (ref 13.3–17.7)
IMM GRANULOCYTES # BLD: 0 10E9/L (ref 0–0.4)
IMM GRANULOCYTES NFR BLD: 0.3 %
INTERPRETATION ECG - MUSE: NORMAL
LYMPHOCYTES # BLD AUTO: 1.8 10E9/L (ref 0.8–5.3)
LYMPHOCYTES NFR BLD AUTO: 22.1 %
MCH RBC QN AUTO: 29.2 PG (ref 26.5–33)
MCHC RBC AUTO-ENTMCNC: 35.9 G/DL (ref 31.5–36.5)
MCV RBC AUTO: 82 FL (ref 78–100)
MONOCYTES # BLD AUTO: 0.6 10E9/L (ref 0–1.3)
MONOCYTES NFR BLD AUTO: 7.1 %
NEUTROPHILS # BLD AUTO: 5.4 10E9/L (ref 1.6–8.3)
NEUTROPHILS NFR BLD AUTO: 67.4 %
NRBC # BLD AUTO: 0 10*3/UL
NRBC BLD AUTO-RTO: 0 /100
PLATELET # BLD AUTO: 321 10E9/L (ref 150–450)
POTASSIUM SERPL-SCNC: 3.7 MMOL/L (ref 3.4–5.3)
RBC # BLD AUTO: 5.51 10E12/L (ref 4.4–5.9)
SODIUM SERPL-SCNC: 137 MMOL/L (ref 133–144)
TROPONIN I SERPL-MCNC: <0.015 UG/L (ref 0–0.04)
WBC # BLD AUTO: 8 10E9/L (ref 4–11)

## 2020-01-02 PROCEDURE — 71046 X-RAY EXAM CHEST 2 VIEWS: CPT

## 2020-01-02 PROCEDURE — 99285 EMERGENCY DEPT VISIT HI MDM: CPT | Mod: 25

## 2020-01-02 PROCEDURE — 84484 ASSAY OF TROPONIN QUANT: CPT | Performed by: EMERGENCY MEDICINE

## 2020-01-02 PROCEDURE — 80048 BASIC METABOLIC PNL TOTAL CA: CPT | Performed by: EMERGENCY MEDICINE

## 2020-01-02 PROCEDURE — 85025 COMPLETE CBC W/AUTO DIFF WBC: CPT | Performed by: EMERGENCY MEDICINE

## 2020-01-02 PROCEDURE — 93005 ELECTROCARDIOGRAM TRACING: CPT

## 2020-01-02 ASSESSMENT — MIFFLIN-ST. JEOR: SCORE: 1953.75

## 2020-01-02 NOTE — ED TRIAGE NOTES
Patient reports chest pain on/off for the last week. He reports it has been inconsistent and not physically induced. Patient now reports left finger numbness.

## 2020-01-02 NOTE — ED PROVIDER NOTES
"  History     Chief Complaint:  Chest Pain    HPI   Alexis Ibrahim is a 29 year old male who presents with ***    Allergies:  Penicillins      Medications:    Albuterol   Allegra  Atarax    Past Medical History:    Asthma    Past Surgical History:    The patient does not have any pertinent past surgical history.    Family History:    No past pertinent family history.    Social History:  Negative for tobacco use.  Positive for alcohol use.  Negative for drug use.  Marital Status:  Single     Review of Systems  ***    Physical Exam     Patient Vitals for the past 24 hrs:   BP Temp Temp src Pulse Heart Rate Resp SpO2 Height Weight   01/02/20 1426 120/73 97.4  F (36.3  C) Oral 86 86 14 97 % 1.854 m (6' 1\") 93 kg (205 lb)     Physical Exam  ***    Emergency Department Course   ECG:  ***    Imaging:  XR Chest PA and LAT:   IMPRESSION: No acute cardiopulmonary disease. as per radiology.    Laboratory:  CBC: WBC: 8.0, HGB: 16.1, PLT: 321  BMP: Glucose 111, (Creatinine: 0.73)  1432 Troponin: <0.015    Procedures:  ***        Emergency Department Course:  ***       Impression & Plan    Medical Decision Making:  ***  Critical Care time:  {none or minutes:847354::\"none\"}    Diagnosis:  No diagnosis found.    Disposition:  {discharged to home/discharged to home with.../Admitted to...:717537}    Discharge Medications:  New Prescriptions    No medications on file       Mariel Wolf  1/2/2020    EMERGENCY DEPARTMENT    "

## 2020-01-02 NOTE — ED AVS SNAPSHOT
Emergency Department  [address] [place] [address]  Phone:  [phone]  Fax:  [phone]                                    Alexis Ibrahim   MRN: [id number]    Department:   Emergency Department   Date of Visit:  1/2/2020           After Visit Summary Signature Page    I have received my discharge instructions, and my questions have been answered. I have discussed any challenges I see with this plan with the nurse or doctor.    ..........................................................................................................................................  Patient/Patient Representative Signature      ..........................................................................................................................................  Patient Representative Print Name and Relationship to Patient    ..................................................               ................................................  Date                                   Time    ..........................................................................................................................................  Reviewed by Signature/Title    ...................................................              ..............................................  Date                                               Time          22EPIC Rev 08/18

## 2020-01-03 NOTE — ED PROVIDER NOTES
Visit Date:   01/02/2020      CHIEF COMPLAINT:  Chest pain.      HISTORY OF PRESENT ILLNESS:  This is a 29-year-old male who presents to the ED with chest pain.  He has been experiencing some chest pain over the past week.  It tends to be a crampy-like feeling in his left lower anterior chest.  It lasts for 5-10 minutes at the most.  It is not related to exercise or exertion or rest; moving, turning does not make a difference.  He cannot recall any injury.  He does not get short of breath or sweaty or nauseated with this.  He has not had this for a few days.  What brought him in today was he has noted some tingling in his left hand involving the middle 3 fingers.  This has been off and on for a day or two.  He did not know if this could be related to the chest discomfort.  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  There is no immediate family history of heart disease or blood clots.  He has had no travel, no leg swelling, and he has no cardiovascular risk factors such as smoking, diabetes, hypertension, high cholesterol.      PAST MEDICAL HISTORY:  Asthma and psoriasis.      MEDICATIONS:  Allegra and albuterol inhaler.  He does use some creams at times.      ALLERGIES:  CAT HAIR AND PENICILLIN.      FAMILY AND SOCIAL HISTORY:  Here with significant other.      REVIEW OF SYSTEMS:  As noted, all other systems being negative.      PHYSICAL EXAMINATION:   GENERAL:  Reveals a young white male supine.   VITAL SIGNS:  Blood pressure 120/73, temperature 97, pulse 86, respirations 14, pulse oximetry 97% on room air.   HEENT:  Head is atraumatic.  Pupils equal, reactive.  Extraocular movements intact.  Nares and oropharynx are clear.     NECK:  The neck  veins are flat.  Carotids no bruits.   LUNGS:  Clear.   CHEST WALL:  Nontender to palpation.   HEART:  Tones are regular without murmurs, rubs or gallops; 2+ radial and femoral pulses bilaterally.   ABDOMEN:  Soft without tenderness, masses or  hepatosplenomegaly.    MUSCULOSKELETAL:  Left arm when he rotates his shoulder on the left side, this seems to increase some tingling in the left hand.  Turning his neck does not make a difference.  When I tap over his volar wrist, he has a positive Tinel sign.  There is no swelling of the wrist itself, and there is a strong radial pulse and normal sensation and motor of the fingers.   NEUROLOGIC:  He is awake, alert and appropriate.  Normal motor, sensation and coordination throughout.   SKIN:  No rash.   LYMPHATICS:  No adenopathy.   PSYCHIATRIC:  Normal affect.      EMERGENCY DEPARTMENT COURSE:  Included EKG, chest x-ray and blood work.  Labs revealed essentially normal electrolytes, a white count of 8, hemoglobin 16, platelet 321 and a negative troponin less than 0.015.  Chest x-ray is unremarkable.  No signs of failure, pneumonia or pneumothorax.  An EKG reveals a normal sinus rhythm, right axis deviation, rate of 77, , , .      I discussed findings with the patient.  I think this is very unlikely to be acute coronary disease, PE or dissection.  It is most likely musculoskeletal chest wall pain.  He does not have signs of costochondritis.  peripheral neuropathy is a second problem, and I think this is likely due to his extensive computer use.  I have talked to him about getting a wrist rest and taking ibuprofen to see if this helps.  The patient will do the above.  He should follow up with his PMD this week.  If symptoms worsen, recheck in the ED.      IMPRESSION:   1.  Chest pain, likely musculoskeletal.   2.  Peripheral neuropathy.      PLAN:  As noted.         MELLY AMADO MD             D: 2020   T: 2020   MT: DON      Name:     CAROLINA FLOYD   MRN:      [id number]        Account:      [id number]   :      1990           Visit Date:   2020      Document: [id number]

## 2020-01-03 NOTE — PROGRESS NOTES
Subjective     Alexis Ibrahim is a 29 year old male who presents to clinic today for the following health issues:    HPI   Asthma Follow-Up    Uses inhaler x 2 daily. Been steady though.   Triggers:       Was ACT completed today?    Yes    ACT Total Scores 1/6/2020   ACT TOTAL SCORE -   ASTHMA ER VISITS -   ASTHMA HOSPITALIZATIONS -   ACT TOTAL SCORE (Goal Greater than or Equal to 20) 15   In the past 12 months, how many times did you visit the emergency room for your asthma without being admitted to the hospital? 0   In the past 12 months, how many times were you hospitalized overnight because of your asthma? 0       How many days per week do you miss taking your asthma controller medication?  I do not have an asthma controller medication    Please describe any recent triggers for your asthma: dust mites, animal dander, exercise or sports and emotions    Have you had any Emergency Room Visits, Urgent Care Visits, or Hospital Admissions since your last office visit?  no      How many servings of fruits and vegetables do you eat daily?  4 or more    On average, how many sweetened beverages do you drink each day (Examples: soda, juice, sweet tea, etc.  Do NOT count diet or artificially sweetened beverages)?   0    How many days per week do you miss taking your medication? 0    Flu shot: Does not do flu shot  No exposure to smoke    Numbness and tingling: finger left 3 lateral    Has had chest pain was in the ER    He does lots of keyboarding    Patient Active Problem List   Diagnosis     CARDIOVASCULAR SCREENING; LDL GOAL LESS THAN 160     Intermittent asthma, uncomplicated     Past Surgical History:   Procedure Laterality Date     none         Social History     Tobacco Use     Smoking status: Never Smoker     Smokeless tobacco: Never Used   Substance Use Topics     Alcohol use: Yes     Alcohol/week: 0.0 standard drinks     Comment: social      Family History   Problem Relation Age of Onset     Unknown/Adopted Mother       Unknown/Adopted Father          Review of Systems   Constitutional, HEENT, cardiovascular, pulmonary, gi and gu systems are negative, except as otherwise noted.      Objective    /62   Pulse 76   Temp 97.6  F (36.4  C) (Oral)   Wt 94.3 kg (208 lb)   SpO2 98%   BMI 27.44 kg/m    Body mass index is 27.44 kg/m .  Physical Exam   GENERAL: healthy, alert and no distress  NECK: no adenopathy and thyroid normal to palpation  RESP: lungs clear to auscultation - no rales, rhonchi or wheezes  CV: regular rate and rhythm, normal S1 S2, no S3 or S4, no murmur, click or rub  MS: Phalen's test: negative    Diagnostic Test Results:  Labs reviewed in Epic        Assessment & Plan     Alexis was seen today for asthma and medication request.    Diagnoses and all orders for this visit:    Mild intermittent asthma without complication    Not well controlled. Discussed the importance of compliance with medical regimen, and various treatment modalities such as beta agonists and inhaled steroids. The concepts of prophylactic and episodic or 'rescue' therapy has been discussed. The patient indicates understanding of these issues and knows when to call this office for help in treatment of asthma; will start an ICS     -     albuterol (PROAIR HFA/PROVENTIL HFA/VENTOLIN HFA) 108 (90 Base) MCG/ACT inhaler; Inhale 2 puffs into the lungs every 6 hours as needed for shortness of breath / dyspnea .     -     fluticasone (FLOVENT HFA) 110 MCG/ACT inhaler; Inhale 2 puffs into the lungs 2 times daily    Numbness and tingling of left hand       -   Consistent with nerve impingement. Discussed ergonomic factors.    Other orders  -     albuterol (PROAIR HFA/PROVENTIL HFA/VENTOLIN HFA) 108 (90 Base) MCG/ACT inhaler; Inhale 2 puffs into the lungs every 6 hours as needed for shortness of breath / dyspnea .       Return in about 4 weeks (around 2/3/2020) for Follow up ACT.    Jonnie Davis MD  [facility]

## 2020-01-06 ENCOUNTER — OFFICE VISIT (OUTPATIENT)
Dept: FAMILY MEDICINE | Facility: CLINIC | Age: 30
End: 2020-01-06
Payer: COMMERCIAL

## 2020-01-06 VITALS
OXYGEN SATURATION: 98 % | TEMPERATURE: 97.6 F | SYSTOLIC BLOOD PRESSURE: 120 MMHG | BODY MASS INDEX: 27.44 KG/M2 | WEIGHT: 208 LBS | DIASTOLIC BLOOD PRESSURE: 62 MMHG | HEART RATE: 76 BPM

## 2020-01-06 DIAGNOSIS — R20.0 NUMBNESS AND TINGLING OF LEFT HAND: ICD-10-CM

## 2020-01-06 DIAGNOSIS — R20.2 NUMBNESS AND TINGLING OF LEFT HAND: ICD-10-CM

## 2020-01-06 DIAGNOSIS — J45.20 MILD INTERMITTENT ASTHMA WITHOUT COMPLICATION: Primary | ICD-10-CM

## 2020-01-06 PROCEDURE — 99214 OFFICE O/P EST MOD 30 MIN: CPT | Performed by: FAMILY MEDICINE

## 2020-01-06 RX ORDER — ALBUTEROL SULFATE 90 UG/1
2 AEROSOL, METERED RESPIRATORY (INHALATION) EVERY 6 HOURS PRN
Qty: 1 INHALER | Refills: 5 | Status: SHIPPED | OUTPATIENT
Start: 2020-01-06 | End: 2021-03-26

## 2020-01-06 RX ORDER — FLUTICASONE PROPIONATE 110 UG/1
2 AEROSOL, METERED RESPIRATORY (INHALATION) 2 TIMES DAILY
Qty: 1 INHALER | Refills: 2 | Status: SHIPPED | OUTPATIENT
Start: 2020-01-06 | End: 2021-03-26

## 2020-01-06 NOTE — LETTER
My Asthma Action Plan    Name: Alexis Ibrahim   YOB: 1990  Date: 1/6/2020   My doctor: Jonnie Davis MD   My clinic: [facility]        My Rescue Medicine:   Albuterol inhaler (Proair/Ventolin/Proventil HFA)  2-4 puffs EVERY 4 HOURS as needed. Use a spacer if recommended by your provider.   My Asthma Severity:   Mild Persistent  Know your asthma triggers: dust mites and animal dander  dust mites  animal dander  exercise or sports  emotions          GREEN ZONE   Good Control    I feel good    No cough or wheeze    Can work, sleep and play without asthma symptoms       Take your asthma control medicine every day.     1. If exercise triggers your asthma, take your rescue medication    15 minutes before exercise or sports, and    During exercise if you have asthma symptoms  2. Spacer to use with inhaler: If you have a spacer, make sure to use it with your inhaler             YELLOW ZONE Getting Worse  I have ANY of these:    I do not feel good    Cough or wheeze    Chest feels tight    Wake up at night   1. Keep taking your Green Zone medications  2. Start taking your rescue medicine:    every 20 minutes for up to 1 hour. Then every 4 hours for 24-48 hours.  3. If you stay in the Yellow Zone for more than 12-24 hours, contact your doctor.  4. If you do not return to the Green Zone in 12-24 hours or you get worse, start taking your oral steroid medicine if prescribed by your provider.           RED ZONE Medical Alert - Get Help  I have ANY of these:    I feel awful    Medicine is not helping    Breathing getting harder    Trouble walking or talking    Nose opens wide to breathe       1. Take your rescue medicine NOW  2. If your provider has prescribed an oral steroid medicine, start taking it NOW  3. Call your doctor NOW  4. If you are still in the Red Zone after 20 minutes and you have not reached your doctor:    Take your rescue medicine again and    Call 911 or go to the emergency  room right away    See your regular doctor within 2 weeks of an Emergency Room or Urgent Care visit for follow-up treatment.          Annual Reminders:  Meet with Asthma Educator,  Flu Shot in the Fall, consider Pneumonia Vaccination for patients with asthma (aged 19 and older).    Pharmacy: [id number] - [place], [place] - [address]    Electronically signed by Jonnie Davis MD   Date: 01/06/20                    Asthma Triggers  How To Control Things That Make Your Asthma Worse    Triggers are things that make your asthma worse.  Look at the list below to help you find your triggers and   what you can do about them. You can help prevent asthma flare-ups by staying away from your triggers.      Trigger                                                          What you can do   Cigarette Smoke  Tobacco smoke can make asthma worse. Do not allow smoking in your home, car or around you.  Be sure no one smokes at a child s day care or school.  If you smoke, ask your health care provider for ways to help you quit.  Ask family members to quit too.  Ask your health care provider for a referral to Quit Plan to help you quit smoking, or call 2-151-799-PLAN.     Colds, Flu, Bronchitis  These are common triggers of asthma. Wash your hands often.  Don t touch your eyes, nose or mouth.  Get a flu shot every year.     Dust Mites  These are tiny bugs that live in cloth or carpet. They are too small to see. Wash sheets and blankets in hot water every week.   Encase pillows and mattress in dust mite proof covers.  Avoid having carpet if you can. If you have carpet, vacuum weekly.   Use a dust mask and HEPA vacuum.   Pollen and Outdoor Mold  Some people are allergic to trees, grass, or weed pollen, or molds. Try to keep your windows closed.  Limit time out doors when pollen count is high.   Ask you health care provider about taking medicine during allergy season.     Animal Dander  Some people are allergic to skin  flakes, urine or saliva from pets with fur or feathers. Keep pets with fur or feathers out of your home.    If you can t keep the pet outdoors, then keep the pet out of your bedroom.  Keep the bedroom door closed.  Keep pets off cloth furniture and away from stuffed toys.     Mice, Rats, and Cockroaches  Some people are allergic to the waste from these pests.   Cover food and garbage.  Clean up spills and food crumbs.  Store grease in the refrigerator.   Keep food out of the bedroom.   Indoor Mold  This can be a trigger if your home has high moisture. Fix leaking faucets, pipes, or other sources of water.   Clean moldy surfaces.  Dehumidify basement if it is damp and smelly.   Smoke, Strong Odors, and Sprays  These can reduce air quality. Stay away from strong odors and sprays, such as perfume, powder, hair spray, paints, smoke incense, paint, cleaning products, candles and new carpet.   Exercise or Sports  Some people with asthma have this trigger. Be active!  Ask your doctor about taking medicine before sports or exercise to prevent symptoms.    Warm up for 5-10 minutes before and after sports or exercise.     Other Triggers of Asthma  Cold air:  Cover your nose and mouth with a scarf.  Sometimes laughing or crying can be a trigger.  Some medicines and food can trigger asthma.

## 2020-01-07 ASSESSMENT — ASTHMA QUESTIONNAIRES: ACT_TOTALSCORE: 15

## 2020-01-16 ENCOUNTER — TELEPHONE (OUTPATIENT)
Dept: FAMILY MEDICINE | Facility: CLINIC | Age: 30
End: 2020-01-16

## 2020-01-16 NOTE — TELEPHONE ENCOUNTER
Patient seen on 1/6/20. Failed ACT. Please mail ACT to patient on or around 2/3/20. Aviva Robbins MA

## 2020-01-16 NOTE — LETTER
February 3, 2020          Alexis Ibrahim,  [address] [place] [id number]          Dear Alexis Ibrahim      Monitoring and managing your preventative and chronic health conditions are very important to us. Our records indicate that you have not scheduled for Asthma Control Test  which was recommended by Dr. Davis.      If you have received your health care elsewhere, please call the clinic so the information can be documented in your chart.    Please call [phone] or message us through your [employer] account to schedule an appointment or provide information for your chart.     Feel free to contact us if you have any questions or concerns!    I look forward to seeing you and working with you on your health care needs.     Sincerely,       [facility]

## 2020-02-17 NOTE — TELEPHONE ENCOUNTER
Called and left patient VM to return call to complete ACT  Maik Martins CMA on 2/17/2020 at 7:33 AM

## 2020-02-23 ENCOUNTER — HEALTH MAINTENANCE LETTER (OUTPATIENT)
Age: 30
End: 2020-02-23

## 2020-12-06 ENCOUNTER — HEALTH MAINTENANCE LETTER (OUTPATIENT)
Age: 30
End: 2020-12-06

## 2021-03-24 NOTE — PATIENT INSTRUCTIONS
I recommend a cream based moisturizer, like CeraVe or Vanicream to apply to the hands daily. Try to apply after washing hands. At night, you can soak your hands in warm water for 10 minutes prior to applying Vaseline with white cotton gloves over top to help absorb it.     For a soap, I recommend a Vanicream or Cetaphil gentle facial cleanser to use as a hand soap. Try to bring one to work with you.     Wound Care After a Biopsy    What is a skin biopsy?  A skin biopsy allows the doctor to examine a very small piece of tissue under the microscope to determine the diagnosis and the best treatment for the skin condition. A local anesthetic (numbing medicine)  is injected with a very small needle into the skin area to be tested. A small piece of skin is taken from the area. Sometimes a suture (stitch) is used.     What are the risks of a skin biopsy?  I will experience scar, bleeding, swelling, pain, crusting and redness. I may experience incomplete removal or recurrence. Risks of this procedure are excessive bleeding, bruising, infection, nerve damage, numbness, thick (hypertrophic or keloidal) scar and non-diagnostic biopsy.    How should I care for my wound for the first 24 hours?    Keep the wound dry and covered for 24 hours    If it bleeds, hold direct pressure on the area for 15 minutes. If bleeding does not stop then go to the emergency room    Avoid strenuous exercise the first 1-2 days or as your doctor instructs you    How should I care for the wound after 24 hours?    After 24 hours, remove the bandage    You may bathe or shower as normal    If you had a scalp biopsy, you can shampoo as usual and can use shower water to clean the biopsy site daily    Clean the wound twice a day with gentle soap and water    Do not scrub, be gentle    Apply white petroleum/Vaseline after cleaning the wound with a cotton swab or a clean finger, and keep the site covered with a Bandaid /bandage. Bandages are not necessary with  Writer attempted to call pt to offer [facility]. management support/assistance. Pt's phone currently out of service. Will continue to make efforts to follow up with pt and assist as needed. a scalp biopsy    If you are unable to cover the site with a Bandaid /bandage, re-apply ointment 2-3 times a day to keep the site moist. Moisture will help with healing    Avoid strenuous activity for first 1-2 days    Avoid lakes, rivers, pools, and oceans until the stitches are removed or the site is healed    How do I clean my wound?    Wash hands thoroughly with soap or use hand  before all wound care    Clean the wound with gentle soap and water    Apply white petroleum/Vaseline  to wound after it is clean    Replace the Bandaid /bandage to keep the wound covered for the first few days or as instructed by your doctor    If you had a scalp biopsy, warm shower water to the area on a daily basis should suffice    What should I use to clean my wound?     Cotton-tipped applicators (Qtips )    White petroleum jelly (Vaseline ). Use a clean new container and use Q-tips to apply.    Bandaids   as needed    Gentle soap     How should I care for my wound long term?    Do not get your wound dirty    Keep up with wound care for one week or until the area is healed.    A small scab will form and fall off by itself when the area is completely healed. The area will be red and will become pink in color as it heals. Sun protection is very important for how your scar will turn out. Sunscreen with an SPF 30 or greater is recommended once the area is healed.    If you have stitches, stitches need to be removed in 10-14 days. You may return to our clinic for this or you may have it done locally at your doctor s office.    You should have some soreness but it should be mild and slowly go away over several days. Talk to your doctor about using tylenol for pain,    When should I call my doctor?  If you have increased:     Pain or swelling    Pus or drainage (clear or slightly yellow drainage is ok)    Temperature over 100F    Spreading redness or warmth around wound    When will I hear about my results?  The biopsy results  can take 2-3 weeks to come back. The clinic will call you with the results, send you a mychart message, or have you schedule a follow-up clinic or phone time to discuss the results. Contact our clinics if you do not hear from us in 3 weeks.     Who should I call with questions?    [facility]: [phone]     [facility]: [phone]    For urgent needs outside of business hours call the [facility] at [phone] and ask for the dermatology resident on call

## 2021-03-26 ENCOUNTER — TELEPHONE (OUTPATIENT)
Dept: FAMILY MEDICINE | Facility: CLINIC | Age: 31
End: 2021-03-26

## 2021-03-26 ENCOUNTER — OFFICE VISIT (OUTPATIENT)
Dept: FAMILY MEDICINE | Facility: CLINIC | Age: 31
End: 2021-03-26
Payer: COMMERCIAL

## 2021-03-26 VITALS
BODY MASS INDEX: 26.37 KG/M2 | HEIGHT: 73 IN | TEMPERATURE: 98 F | WEIGHT: 199 LBS | HEART RATE: 79 BPM | OXYGEN SATURATION: 98 % | DIASTOLIC BLOOD PRESSURE: 64 MMHG | SYSTOLIC BLOOD PRESSURE: 122 MMHG

## 2021-03-26 DIAGNOSIS — K60.2 ANAL FISSURE: ICD-10-CM

## 2021-03-26 DIAGNOSIS — F41.9 ANXIOUSNESS: ICD-10-CM

## 2021-03-26 DIAGNOSIS — Z11.4 SCREENING FOR HIV (HUMAN IMMUNODEFICIENCY VIRUS): ICD-10-CM

## 2021-03-26 DIAGNOSIS — Z00.00 ROUTINE HISTORY AND PHYSICAL EXAMINATION OF ADULT: Primary | ICD-10-CM

## 2021-03-26 DIAGNOSIS — Z11.59 NEED FOR HEPATITIS C SCREENING TEST: ICD-10-CM

## 2021-03-26 DIAGNOSIS — J45.20 MILD INTERMITTENT ASTHMA, UNSPECIFIED WHETHER COMPLICATED: ICD-10-CM

## 2021-03-26 LAB
ANION GAP SERPL CALCULATED.3IONS-SCNC: 3 MMOL/L (ref 3–14)
BUN SERPL-MCNC: 12 MG/DL (ref 7–30)
CALCIUM SERPL-MCNC: 9 MG/DL (ref 8.5–10.1)
CHLORIDE SERPL-SCNC: 106 MMOL/L (ref 94–109)
CHOLEST SERPL-MCNC: 208 MG/DL
CO2 SERPL-SCNC: 29 MMOL/L (ref 20–32)
CREAT SERPL-MCNC: 0.83 MG/DL (ref 0.66–1.25)
GFR SERPL CREATININE-BSD FRML MDRD: >90 ML/MIN/{1.73_M2}
GLUCOSE SERPL-MCNC: 84 MG/DL (ref 70–99)
HDLC SERPL-MCNC: 53 MG/DL
LDLC SERPL CALC-MCNC: 132 MG/DL
NONHDLC SERPL-MCNC: 155 MG/DL
POTASSIUM SERPL-SCNC: 4.1 MMOL/L (ref 3.4–5.3)
SODIUM SERPL-SCNC: 138 MMOL/L (ref 133–144)
TRIGL SERPL-MCNC: 113 MG/DL

## 2021-03-26 PROCEDURE — 80048 BASIC METABOLIC PNL TOTAL CA: CPT | Performed by: FAMILY MEDICINE

## 2021-03-26 PROCEDURE — 80061 LIPID PANEL: CPT | Performed by: FAMILY MEDICINE

## 2021-03-26 PROCEDURE — 99214 OFFICE O/P EST MOD 30 MIN: CPT | Mod: 25 | Performed by: FAMILY MEDICINE

## 2021-03-26 PROCEDURE — 99395 PREV VISIT EST AGE 18-39: CPT | Performed by: FAMILY MEDICINE

## 2021-03-26 PROCEDURE — 36415 COLL VENOUS BLD VENIPUNCTURE: CPT | Performed by: FAMILY MEDICINE

## 2021-03-26 PROCEDURE — 86803 HEPATITIS C AB TEST: CPT | Performed by: FAMILY MEDICINE

## 2021-03-26 RX ORDER — ALBUTEROL SULFATE 90 UG/1
2 AEROSOL, METERED RESPIRATORY (INHALATION) EVERY 6 HOURS PRN
Qty: 18 G | Refills: 5 | Status: SHIPPED | OUTPATIENT
Start: 2021-03-26 | End: 2022-07-01

## 2021-03-26 RX ORDER — HYDROXYZINE PAMOATE 25 MG/1
25-50 CAPSULE ORAL 3 TIMES DAILY PRN
Qty: 30 CAPSULE | Refills: 1 | Status: SHIPPED | OUTPATIENT
Start: 2021-03-26 | End: 2022-07-01

## 2021-03-26 RX ORDER — FEXOFENADINE HCL 180 MG/1
TABLET ORAL PRN
Status: CANCELLED | OUTPATIENT
Start: 2021-03-26

## 2021-03-26 ASSESSMENT — ENCOUNTER SYMPTOMS
COUGH: 0
DIARRHEA: 0
HEMATURIA: 0
HEARTBURN: 0
NAUSEA: 0
HEADACHES: 0
NERVOUS/ANXIOUS: 1
WEAKNESS: 0
ABDOMINAL PAIN: 0
HEMATOCHEZIA: 0
PARESTHESIAS: 0
ARTHRALGIAS: 1
DYSURIA: 0
SORE THROAT: 0
FEVER: 0
MYALGIAS: 0
EYE PAIN: 0
JOINT SWELLING: 0
PALPITATIONS: 0
CHILLS: 0
FREQUENCY: 0
DIZZINESS: 0
SHORTNESS OF BREATH: 0
CONSTIPATION: 0

## 2021-03-26 ASSESSMENT — MIFFLIN-ST. JEOR: SCORE: 1921.54

## 2021-03-26 NOTE — TELEPHONE ENCOUNTER
Rashel with [facility] pharmacy calling  The nifedipine 0.2% in white petrolatum 0.2 % OINT ointment would need to go to a compounding pharmacy  They are unable to compound/fill this and have not notified patient of this    Left message on voicemail for patient to return call to the clinic at [phone]  Please let patient know that [facility] pharmacy cannot compound the ointment.  Does he want us to resend it to [facility]? (the pharmacy could then contact him regarding cost/coverage and discuss options to mail out or  prescription)    Sal Palumbo RN  [facility]

## 2021-03-26 NOTE — PROGRESS NOTES
"SUBJECTIVE:   CC: Alexis Ibrahim is an 30 year old male who presents for preventative health visit.     Patient has been advised of split billing requirements and indicates understanding: Yes  Healthy Habits:     Getting at least 3 servings of Calcium per day:  Yes    Bi-annual eye exam:  NO    Dental care twice a year:  Yes    Sleep apnea or symptoms of sleep apnea:  None    Diet:  Regular (no restrictions)    Frequency of exercise:  2-3 days/week    Duration of exercise:  15-30 minutes    Taking medications regularly:  Yes    Medication side effects:  Not applicable    PHQ-2 Total Score: 0    Additional concerns today:  Yes        Duc is a 31 yo with intermittent asthma, allergy to cat hair,  anxiety/panic attacks on albuterol and flovent    care gaps\" acp, pcv, hiv, hep c,. act/aap, flu, phq2,     Concerns:    1, Asthma:  Needs albuterol; uses about once a day. Not been woken up by asthma. Not doing Flovent and Allegra at night,        ACT Total Scores 2/12/2019 1/6/2020 3/26/2021   ACT TOTAL SCORE - - -   ASTHMA ER VISITS - - -   ASTHMA HOSPITALIZATIONS - - -   ACT TOTAL SCORE (Goal Greater than or Equal to 20) 18 15 21   In the past 12 months, how many times did you visit the emergency room for your asthma without being admitted to the hospital? 0 0 0   In the past 12 months, how many times were you hospitalized overnight because of your asthma? 0 0 0     2. Arthralgias: Lt elbow and wrist; they cracking for a couple of months. Sometimes radiates to the neck. Some pain in the top of Rt shoulder. No swelling. Feels tightness in the elbow.  In IT behind desk. Rt  Handed.    3. Nervousness:      Work been stressful also got  a month ago.    4. Anal fissure      Was given Nitroglycerin; gets palpitations.    Today's PHQ-2 Score:   PHQ-2 ( 1999 Pfizer) 3/26/2021   Q1: Little interest or pleasure in doing things 0   Q2: Feeling down, depressed or hopeless 0   PHQ-2 Score 0   Q1: Little interest or pleasure in " doing things Not at all   Q2: Feeling down, depressed or hopeless Not at all   PHQ-2 Score 0     Abuse: Current or Past(Physical, Sexual or Emotional)- NO  Do you feel safe in your environment? Yes    Have you ever done Advance Care Planning? (For example, a Health Directive, POLST, or a discussion with a medical provider or your loved ones about your wishes): No, advance care planning information given to patient to review.  Patient plans to discuss their wishes with loved ones or provider.      Social History     Tobacco Use     Smoking status: Never Smoker     Smokeless tobacco: Never Used   Substance Use Topics     Alcohol use: Yes     Alcohol/week: 0.0 standard drinks     Comment: social      If you drink alcohol do you typically have >3 drinks per day or >7 drinks per week? No    Alcohol Use 3/26/2021   Prescreen: >3 drinks/day or >7 drinks/week? No   Prescreen: >3 drinks/day or >7 drinks/week? -   No flowsheet data found.    Last PSA: No results found for: PSA    Reviewed orders with patient. Reviewed health maintenance and updated orders accordingly - Yes  Patient Active Problem List   Diagnosis     CARDIOVASCULAR SCREENING; LDL GOAL LESS THAN 160     Intermittent asthma, uncomplicated     Past Surgical History:   Procedure Laterality Date     none         Social History     Tobacco Use     Smoking status: Never Smoker     Smokeless tobacco: Never Used   Substance Use Topics     Alcohol use: Yes     Alcohol/week: 0.0 standard drinks     Comment: social      Family History   Problem Relation Age of Onset     Unknown/Adopted Mother      Unknown/Adopted Father          Reviewed and updated as needed this visit by clinical staff  Tobacco  Allergies  Meds              Reviewed and updated as needed this visit by Provider                Review of Systems   Constitutional: Negative for chills and fever.   HENT: Negative for congestion, ear pain, hearing loss and sore throat.    Eyes: Negative for pain and visual  "disturbance.   Respiratory: Negative for cough and shortness of breath.    Cardiovascular: Negative for chest pain, palpitations and peripheral edema.   Gastrointestinal: Negative for abdominal pain, constipation, diarrhea, heartburn, hematochezia and nausea.   Genitourinary: Negative for discharge, dysuria, frequency, genital sores, hematuria, impotence and urgency.   Musculoskeletal: Positive for arthralgias. Negative for joint swelling and myalgias.   Skin: Negative for rash.   Neurological: Negative for dizziness, weakness, headaches and paresthesias.   Psychiatric/Behavioral: Negative for mood changes. The patient is nervous/anxious.        OBJECTIVE:   /64   Pulse 79   Temp 98  F (36.7  C) (Oral)   Ht 1.854 m (6' 1\")   Wt 90.3 kg (199 lb)   SpO2 98%   BMI 26.25 kg/m      Physical Exam  GENERAL: healthy, alert and no distress  EYES: Eyes grossly normal to inspection, PERRL and conjunctivae and sclerae normal  HENT: ear canals and TM's normal, nose and mouth without ulcers or lesions  NECK: no adenopathy, no asymmetry, masses, or scars and thyroid normal to palpation  RESP: lungs clear to auscultation - no rales, rhonchi or wheezes  CV: regular rate and rhythm, normal S1 S2, no S3 or S4, no murmur, click or rub, no peripheral edema and peripheral pulses strong  ABDOMEN: soft, nontender, no hepatosplenomegaly, no masses and bowel sounds normal  MS: no gross musculoskeletal defects noted, no edema  SKIN: no suspicious lesions or rashes  NEURO: Normal strength and tone, mentation intact and speech normal  PSYCH: mentation appears normal, affect normal/bright    Diagnostic Test Results:  Labs reviewed in Epic    ASSESSMENT/PLAN:   Alexis was seen today for physical.    Diagnoses and all orders for this visit:    Routine history and physical examination of adult  -     Basic metabolic panel  -     Lipid Profile (Chol, Trig, HDL, LDL calc)    Mild intermittent asthma, unspecified whether complicated    ACT " "score 21. Refill albuterol.  -     albuterol (PROAIR HFA/PROVENTIL HFA/VENTOLIN HFA) 108 (90 Base) MCG/ACT inhaler; Inhale 2 puffs into the lungs every 6 hours as needed for shortness of breath / dyspnea . No further refills until follow-up appointment    Anxiousness    I discussed the potential side effects of antianxiety medications and mphasized the importance of a multi-armed approach towards the treatment of anxiety including regular exercise, adequate and the importance of ongoing development of his support network.   -     hydrOXYzine (VISTARIL) 25 MG capsule; Take 1-2 capsules (25-50 mg) by mouth 3 times daily as needed for anxiety    Need for hepatitis C screening test  -     Hepatitis C Screen Reflex to HCV RNA Quant and Genotype    Anal fissure     Nitroglycerine was very expensive, will try nifedipine  -     nifedipine 0.2% in white petrolatum 0.2 % OINT ointment; Apply topically 2 times daily    Patient has been advised of split billing requirements and indicates understanding: Yes    COUNSELING:   Reviewed preventive health counseling, as reflected in patient instructions       Regular exercise       Healthy diet/nutrition       Consider Hep C screening for all patients one time for ages 18-79 years    Estimated body mass index is 26.25 kg/m  as calculated from the following:    Height as of this encounter: 1.854 m (6' 1\").    Weight as of this encounter: 90.3 kg (199 lb).     Weight management plan: Discussed healthy diet and exercise guidelines    He reports that he has never smoked. He has never used smokeless tobacco.    Counseling Resources:  ATP IV Guidelines  Pooled Cohorts Equation Calculator  FRAX Risk Assessment  ICSI Preventive Guidelines  Dietary Guidelines for Americans, 2010  USDA's MyPlate  ASA Prophylaxis  Lung CA Screening    Jonnie Davis MD  [facility] [place]  "

## 2021-03-27 LAB — HCV AB SERPL QL IA: NONREACTIVE

## 2021-03-27 ASSESSMENT — ASTHMA QUESTIONNAIRES: ACT_TOTALSCORE: 21

## 2021-03-30 NOTE — TELEPHONE ENCOUNTER
Called patient and notified him that [facility] could not compound the Rx. He was okay with using [facility] compounding pharmacy. Rx was re-sent. Pt was given pharmacy phone number and address.

## 2021-05-10 ENCOUNTER — OFFICE VISIT (OUTPATIENT)
Dept: FAMILY MEDICINE | Facility: CLINIC | Age: 31
End: 2021-05-10
Payer: COMMERCIAL

## 2021-05-10 VITALS
BODY MASS INDEX: 26.91 KG/M2 | WEIGHT: 204 LBS | TEMPERATURE: 98 F | HEART RATE: 87 BPM | SYSTOLIC BLOOD PRESSURE: 122 MMHG | DIASTOLIC BLOOD PRESSURE: 60 MMHG | OXYGEN SATURATION: 98 %

## 2021-05-10 DIAGNOSIS — N52.9 ERECTILE DYSFUNCTION, UNSPECIFIED ERECTILE DYSFUNCTION TYPE: Primary | ICD-10-CM

## 2021-05-10 DIAGNOSIS — F41.1 GENERALIZED ANXIETY DISORDER: ICD-10-CM

## 2021-05-10 DIAGNOSIS — J45.20 MILD INTERMITTENT ASTHMA WITHOUT COMPLICATION: ICD-10-CM

## 2021-05-10 PROCEDURE — 36415 COLL VENOUS BLD VENIPUNCTURE: CPT | Performed by: FAMILY MEDICINE

## 2021-05-10 PROCEDURE — 99213 OFFICE O/P EST LOW 20 MIN: CPT | Performed by: FAMILY MEDICINE

## 2021-05-10 PROCEDURE — 99000 SPECIMEN HANDLING OFFICE-LAB: CPT | Performed by: FAMILY MEDICINE

## 2021-05-10 PROCEDURE — 84403 ASSAY OF TOTAL TESTOSTERONE: CPT | Mod: 90 | Performed by: FAMILY MEDICINE

## 2021-05-10 RX ORDER — SILDENAFIL 100 MG/1
50-100 TABLET, FILM COATED ORAL DAILY PRN
Qty: 12 TABLET | Refills: 3 | Status: SHIPPED | OUTPATIENT
Start: 2021-05-10 | End: 2022-07-01

## 2021-05-10 NOTE — PROGRESS NOTES
Assessment & Plan   Duc is a 31 yo with intermittent asthma, allergy to cat hair,  anxiety/panic attacks on albuterol and flovent    Erectile dysfunction, unspecified erectile dysfunction type    Discussed the nature and pathophysiology of erectile dysfunction, that this is primarily physiologic and incidence is increased with age and any underlying vascular disease. Went over the use of Viagra and similar drugs for treating this disorder.  Discussed risks, benefits, side effects, and alternatives of therapy. Recheck if therapy is not effective or if side effects preclude its use.    - Testosterone, total  - sildenafil (VIAGRA) 100 MG tablet; Take 0.5-1 tablets ( mg) by mouth daily as needed (ED)    Mild intermittent asthma without complication    -  Stable. ACT score    Generalized anxiety disorder    -  Hydroxyzine as needed    Return in about 3 months (around 8/10/2021) for Routine Visit.    Jonnie Davis MD  [facility] [place]    Subjective   Duc is a 31 year old who presents for the following health issues:    HPI     Chief Complaint   Patient presents with     Erectile Dysfunction     Been going on for a couple of months  Got  in [place] 2021  Has trouble getting and erection; sometimes is okay (every couple of weeks))  Also has some work stress (on and off), sports (baseball) and in graduate school.  The feeling is discouraging     Asthma/Allergies.    On albuterol and allegra.     ACT Total Scores 1/6/2020 3/26/2021 5/10/2021   ACT TOTAL SCORE - - -   ASTHMA ER VISITS - - -   ASTHMA HOSPITALIZATIONS - - -   ACT TOTAL SCORE (Goal Greater than or Equal to 20) 15 21 20   In the past 12 months, how many times did you visit the emergency room for your asthma without being admitted to the hospital? 0 0 0   In the past 12 months, how many times were you hospitalized overnight because of your asthma? 0 0 0     Anxiety     Takes Hydroxyzine.    Review of Systems   Constitutional,  HEENT, cardiovascular, pulmonary and gi systems are negative, except as otherwise noted.      Objective    /60   Pulse 87   Temp 98  F (36.7  C) (Oral)   Wt 92.5 kg (204 lb)   SpO2 98%   BMI 26.91 kg/m    Body mass index is 26.91 kg/m .  Physical Exam   GENERAL: healthy, alert and no distress  RESP: lungs clear to auscultation - no rales, rhonchi or wheezes  CV: regular rate and rhythm, no murmur, click or rub  MS: no gross musculoskeletal defects noted, no edema  PSYCH: mentation appears normal, affect normal/bright

## 2021-05-11 ASSESSMENT — ASTHMA QUESTIONNAIRES: ACT_TOTALSCORE: 20

## 2021-05-12 LAB — TESTOST SERPL-MCNC: 435 NG/DL (ref 240–950)

## 2021-09-26 ENCOUNTER — HEALTH MAINTENANCE LETTER (OUTPATIENT)
Age: 31
End: 2021-09-26

## 2022-03-07 ENCOUNTER — E-VISIT (OUTPATIENT)
Dept: URGENT CARE | Facility: URGENT CARE | Age: 32
End: 2022-03-07
Payer: COMMERCIAL

## 2022-03-07 DIAGNOSIS — H00.15 CHALAZION OF LEFT LOWER EYELID: Primary | ICD-10-CM

## 2022-03-07 PROCEDURE — 99421 OL DIG E/M SVC 5-10 MIN: CPT | Performed by: PHYSICIAN ASSISTANT

## 2022-03-07 NOTE — PATIENT INSTRUCTIONS
Lakisha Xavier,       Thank you for choosing us for your care. I think an in-clinic visit would be best next steps based on your symptoms. I referred you to ophthmalogy for further evaluation.       Thanks,    Maddie Arthur PA-C on 3/7/2022 at 3:05 PM

## 2022-04-13 ENCOUNTER — TELEPHONE (OUTPATIENT)
Dept: OPHTHALMOLOGY | Facility: CLINIC | Age: 32
End: 2022-04-13

## 2022-04-13 ENCOUNTER — OFFICE VISIT (OUTPATIENT)
Dept: OPHTHALMOLOGY | Facility: CLINIC | Age: 32
End: 2022-04-13
Attending: PHYSICIAN ASSISTANT
Payer: COMMERCIAL

## 2022-04-13 DIAGNOSIS — H00.15 CHALAZION OF LEFT LOWER EYELID: ICD-10-CM

## 2022-04-13 DIAGNOSIS — H01.015 ULCERATIVE BLEPHARITIS OF LEFT LOWER EYELID: Primary | ICD-10-CM

## 2022-04-13 DIAGNOSIS — H02.889 MEIBOMIAN GLAND DYSFUNCTION: ICD-10-CM

## 2022-04-13 PROCEDURE — 99203 OFFICE O/P NEW LOW 30 MIN: CPT | Performed by: OPHTHALMOLOGY

## 2022-04-13 RX ORDER — NEOMYCIN SULFATE, POLYMYXIN B SULFATE, AND DEXAMETHASONE 3.5; 10000; 1 MG/G; [USP'U]/G; MG/G
OINTMENT OPHTHALMIC
Qty: 3.5 G | Refills: 1 | Status: SHIPPED | OUTPATIENT
Start: 2022-04-13 | End: 2023-09-22

## 2022-04-13 RX ORDER — DOXYCYCLINE HYCLATE 50 MG/1
50 CAPSULE ORAL DAILY
Qty: 21 CAPSULE | Refills: 0 | Status: SHIPPED | OUTPATIENT
Start: 2022-04-13 | End: 2022-05-04

## 2022-04-13 ASSESSMENT — CONF VISUAL FIELD
METHOD: COUNTING FINGERS
OS_NORMAL: 1
OD_NORMAL: 1

## 2022-04-13 ASSESSMENT — TONOMETRY
OD_IOP_MMHG: 17
IOP_METHOD: ICARE
OS_IOP_MMHG: 16

## 2022-04-13 ASSESSMENT — VISUAL ACUITY
METHOD: SNELLEN - LINEAR
OS_SC: 20/20
OD_SC: 20/20

## 2022-04-13 NOTE — TELEPHONE ENCOUNTER
[facility] Call Center    Phone Message    May a detailed message be left on voicemail: yes     Reason for Call: Medication Question or concern regarding medication   Prescription Clarification  Name of Medication: neomycin-polymyxin-dexamethasone (MAXITROL) 3.5-57321-4.1 ophthalmic ointment  Prescribing Provider: Roselyn   Pharmacy: [facility], [place]   What on the order needs clarification? Please call pharmacy back to let them know what the duration of use is.  Thanks.      Action Taken: Message routed to:  Adult Clinics: Eye p [phone]    Travel Screening: Not Applicable

## 2022-04-13 NOTE — PROGRESS NOTES
Chief Complaint(s) and History of Present Illness(es)     Consult For     Laterality: right eye    Course: stable    Associated symptoms: itching.  Negative for eye pain    Treatments tried: warm compresses    Pain scale: 0/10              Comments     Referral from ABHAY Martins for eval of LLL bump.  Pt notes bump on LLL has been there since January.  Complains of occasional itchiness & crust.  Has tried warm compresses with minimal relief.  Ocular meds: None    Kathryn Ulisses OT 8:29 AM April 13, 2022      History of psoriasis. Uses Vaseline.   Not on acutane.         Assessment & Plan     Alexis Ibrahim is a 32 year old male with the following diagnoses:     Encounter Diagnoses   Name Primary?     Chalazion of left lower eyelid      Ulcerative blepharitis of left lower eyelid Yes     Meibomian gland dysfunction      No obvious nodule to drain but significant blepharitis, and Meibomian gland dysfunction of left lower lid.     Discussed options. Will start with doxy 50 mg daily for 3 weeks, and Maxitrol myriam to left lower lid three times a day and into left eye at bedtime. Warm compresses three times a day.     If not improved consider steroid injection.     Patient disposition:   No follow-ups on file.        Attending Physician Attestation: Complete documentation of historical and exam elements from today's encounter can be found in the full encounter summary report (not reduplicated in this progress note). I personally obtained the chief complaint(s) and history of present illness. I confirmed and edited as necessary the review of systems, past medical/surgical history, family history, social history, and examination findings as documented by others; and I examined the patient myself. I personally reviewed the relevant tests, images, and reports as documented above. I formulated and edited as necessary the assessment and plan and discussed the findings and management plan with the patient.  Norberto  MD Jennifer

## 2022-04-13 NOTE — LETTER
2022         RE:  :  MRN: Alexis Ibrahim  1990  [id number]     Dear Dr. Arthur,    Thank you for asking me to see your patient, Alexis Ibrahim, for an oculoplastic   consultation.  My assessment and plan are below.  For further details, please see my attached clinic note.      Chief Complaint(s) and History of Present Illness(es)     Consult For     Laterality: right eye    Course: stable    Associated symptoms: itching.  Negative for eye pain    Treatments tried: warm compresses    Pain scale: 0/10              Comments     Referral from ABHAY Martins for eval of LLL bump.  Pt notes bump on LLL has been there since January.  Complains of occasional itchiness & crust.  Has tried warm compresses with minimal relief.  Ocular meds: None    Kathryn Hoschette OT 8:29 AM 2022      History of psoriasis. Uses Vaseline.   Not on acutane.         Assessment & Plan     Alexis Ibrahim is a 32 year old male with the following diagnoses:     Encounter Diagnoses   Name Primary?     Chalazion of left lower eyelid      Ulcerative blepharitis of left lower eyelid Yes     Meibomian gland dysfunction      No obvious nodule to drain but significant blepharitis, and Meibomian gland dysfunction of left lower lid.     Discussed options. Will start with doxy 50 mg daily for 3 weeks, and Maxitrol myriam to left lower lid three times a day and into left eye at bedtime. Warm compresses three times a day.     If not improved consider steroid injection.     Patient disposition:   No follow-ups on file.         Again, thank you for allowing me to participate in the care of your patient.      Sincerely,    Roselyn Rodriguez MD  Department of Ophthalmology and Visual Neurosciences  [facility]    CC: Maddie Arthur PA-C  [facility]  [id number]  [facility] [address]  Via In Basket

## 2022-04-13 NOTE — NURSING NOTE
Chief Complaints and History of Present Illnesses   Patient presents with     Consult For     Chief Complaint(s) and History of Present Illness(es)     Consult For     Laterality: right eye    Course: stable    Associated symptoms: itching.  Negative for eye pain    Treatments tried: warm compresses    Pain scale: 0/10              Comments     Referral from ABHAY Martins for eval of LLL bump.  Pt notes bump on LLL has been there since January.  Complains of occasional itchiness & crust.  Has tried warm compresses with minimal relief.  Ocular meds: None    Kathryn Gtz OT 8:29 AM April 13, 2022

## 2022-05-08 ENCOUNTER — HEALTH MAINTENANCE LETTER (OUTPATIENT)
Age: 32
End: 2022-05-08

## 2022-06-24 ASSESSMENT — ENCOUNTER SYMPTOMS
JOINT SWELLING: 0
WEAKNESS: 0
ARTHRALGIAS: 1
SHORTNESS OF BREATH: 0
PALPITATIONS: 0
HEMATURIA: 0
HEMATOCHEZIA: 0
NERVOUS/ANXIOUS: 1
HEARTBURN: 0
DIARRHEA: 0
NAUSEA: 0
FREQUENCY: 1
CHILLS: 0
PARESTHESIAS: 0
DYSURIA: 0
SORE THROAT: 0
EYE PAIN: 0
COUGH: 0
MYALGIAS: 1
DIZZINESS: 0
HEADACHES: 0
FEVER: 0
ABDOMINAL PAIN: 0
CONSTIPATION: 0

## 2022-07-01 ENCOUNTER — OFFICE VISIT (OUTPATIENT)
Dept: FAMILY MEDICINE | Facility: CLINIC | Age: 32
End: 2022-07-01
Payer: COMMERCIAL

## 2022-07-01 VITALS
HEIGHT: 75 IN | OXYGEN SATURATION: 97 % | HEART RATE: 87 BPM | SYSTOLIC BLOOD PRESSURE: 112 MMHG | RESPIRATION RATE: 17 BRPM | WEIGHT: 209.8 LBS | BODY MASS INDEX: 26.08 KG/M2 | DIASTOLIC BLOOD PRESSURE: 72 MMHG

## 2022-07-01 DIAGNOSIS — Z00.00 ROUTINE HISTORY AND PHYSICAL EXAMINATION OF ADULT: Primary | ICD-10-CM

## 2022-07-01 DIAGNOSIS — N52.9 ERECTILE DYSFUNCTION, UNSPECIFIED ERECTILE DYSFUNCTION TYPE: ICD-10-CM

## 2022-07-01 DIAGNOSIS — K60.2 ANAL FISSURE: ICD-10-CM

## 2022-07-01 DIAGNOSIS — M25.511 ACUTE PAIN OF RIGHT SHOULDER: ICD-10-CM

## 2022-07-01 DIAGNOSIS — F41.9 ANXIOUSNESS: ICD-10-CM

## 2022-07-01 DIAGNOSIS — J45.20 MILD INTERMITTENT ASTHMA, UNSPECIFIED WHETHER COMPLICATED: ICD-10-CM

## 2022-07-01 DIAGNOSIS — J45.20 MILD INTERMITTENT ASTHMA WITHOUT COMPLICATION: ICD-10-CM

## 2022-07-01 LAB
ANION GAP SERPL CALCULATED.3IONS-SCNC: 6 MMOL/L (ref 3–14)
BUN SERPL-MCNC: 9 MG/DL (ref 7–30)
CALCIUM SERPL-MCNC: 9.4 MG/DL (ref 8.5–10.1)
CHLORIDE BLD-SCNC: 107 MMOL/L (ref 94–109)
CHOLEST SERPL-MCNC: 195 MG/DL
CO2 SERPL-SCNC: 26 MMOL/L (ref 20–32)
CREAT SERPL-MCNC: 0.77 MG/DL (ref 0.66–1.25)
FASTING STATUS PATIENT QL REPORTED: YES
GFR SERPL CREATININE-BSD FRML MDRD: >90 ML/MIN/1.73M2
GLUCOSE BLD-MCNC: 91 MG/DL (ref 70–99)
HDLC SERPL-MCNC: 51 MG/DL
LDLC SERPL CALC-MCNC: 133 MG/DL
NONHDLC SERPL-MCNC: 144 MG/DL
POTASSIUM BLD-SCNC: 3.9 MMOL/L (ref 3.4–5.3)
SODIUM SERPL-SCNC: 139 MMOL/L (ref 133–144)
TRIGL SERPL-MCNC: 55 MG/DL

## 2022-07-01 PROCEDURE — 80061 LIPID PANEL: CPT | Performed by: FAMILY MEDICINE

## 2022-07-01 PROCEDURE — 36415 COLL VENOUS BLD VENIPUNCTURE: CPT | Performed by: FAMILY MEDICINE

## 2022-07-01 PROCEDURE — 80048 BASIC METABOLIC PNL TOTAL CA: CPT | Performed by: FAMILY MEDICINE

## 2022-07-01 PROCEDURE — 99214 OFFICE O/P EST MOD 30 MIN: CPT | Mod: 25 | Performed by: FAMILY MEDICINE

## 2022-07-01 PROCEDURE — 99395 PREV VISIT EST AGE 18-39: CPT | Performed by: FAMILY MEDICINE

## 2022-07-01 RX ORDER — ALBUTEROL SULFATE 90 UG/1
2 AEROSOL, METERED RESPIRATORY (INHALATION) EVERY 6 HOURS PRN
Qty: 18 G | Refills: 5 | Status: SHIPPED | OUTPATIENT
Start: 2022-07-01 | End: 2023-08-14

## 2022-07-01 RX ORDER — SILDENAFIL 100 MG/1
50-100 TABLET, FILM COATED ORAL DAILY PRN
Qty: 12 TABLET | Refills: 3 | Status: SHIPPED | OUTPATIENT
Start: 2022-07-01 | End: 2023-08-08

## 2022-07-01 RX ORDER — HYDROXYZINE PAMOATE 25 MG/1
25-50 CAPSULE ORAL 3 TIMES DAILY PRN
Qty: 30 CAPSULE | Refills: 1 | Status: SHIPPED | OUTPATIENT
Start: 2022-07-01 | End: 2023-09-22

## 2022-07-01 ASSESSMENT — ENCOUNTER SYMPTOMS
ABDOMINAL PAIN: 0
PALPITATIONS: 0
CONSTIPATION: 0
SORE THROAT: 0
PARESTHESIAS: 0
SHORTNESS OF BREATH: 0
ARTHRALGIAS: 1
MYALGIAS: 1
NAUSEA: 0
DIZZINESS: 0
COUGH: 0
JOINT SWELLING: 0
FEVER: 0
EYE PAIN: 0
WEAKNESS: 0
HEADACHES: 0
CHILLS: 0
DIARRHEA: 0
NERVOUS/ANXIOUS: 1
DYSURIA: 0
HEARTBURN: 0
HEMATURIA: 0
FREQUENCY: 1
HEMATOCHEZIA: 0

## 2022-07-01 ASSESSMENT — ASTHMA QUESTIONNAIRES: ACT_TOTALSCORE: 22

## 2022-07-01 ASSESSMENT — PAIN SCALES - GENERAL: PAINLEVEL: SEVERE PAIN (6)

## 2022-07-01 NOTE — PROGRESS NOTES
SUBJECTIVE:   CC: Alexis Ibrahim is an 32 year old male who presents for preventative health visit.   Healthy Habits:     Getting at least 3 servings of Calcium per day:  NO    Bi-annual eye exam:  Yes    Dental care twice a year:  Yes    Sleep apnea or symptoms of sleep apnea:  None    Diet:  Regular (no restrictions)    Frequency of exercise:  2-3 days/week    Duration of exercise:  30-45 minutes    Taking medications regularly:  Yes    PHQ-2 Total Score: 0    Additional concerns today:  Yes    Concerns:  Rt shoulder pain x 6 weeks   Plays baseball; does pitching and throwing from some angles really hurts. FROM.    Anal fissure: still present, tried nifedipine and had palpitations.     Asthma: doing well     ACT Total Scores 3/26/2021 5/10/2021 7/1/2022   ACT TOTAL SCORE - - -   ASTHMA ER VISITS - - -   ASTHMA HOSPITALIZATIONS - - -   ACT TOTAL SCORE (Goal Greater than or Equal to 20) 21 20 22   In the past 12 months, how many times did you visit the emergency room for your asthma without being admitted to the hospital? 0 0 0   In the past 12 months, how many times were you hospitalized overnight because of your asthma? 0 0 0     ED:   Needing refill for Viagra    Anxiety:   Been doing Hydroxyzine and does help.    Today's PHQ-2 Score:   PHQ-2 ( 1999 Pfizer) 6/24/2022   Q1: Little interest or pleasure in doing things 0   Q2: Feeling down, depressed or hopeless 0   PHQ-2 Score 0   PHQ-2 Total Score (12-17 Years)- Positive if 3 or more points; Administer PHQ-A if positive -   Q1: Little interest or pleasure in doing things Not at all   Q2: Feeling down, depressed or hopeless Not at all   PHQ-2 Score 0     Abuse: Current or Past(Physical, Sexual or Emotional)- No  Do you feel safe in your environment? Yes    Social History     Tobacco Use     Smoking status: Never Smoker     Smokeless tobacco: Never Used   Substance Use Topics     Alcohol use: Yes     Comment: social      If you drink alcohol do you typically have >3  drinks per day or >7 drinks per week? No    Alcohol Use 6/24/2022   Prescreen: >3 drinks/day or >7 drinks/week? No   Prescreen: >3 drinks/day or >7 drinks/week? -   No flowsheet data found.    Last PSA: No results found for: PSA    Reviewed orders with patient. Reviewed health maintenance and updated orders accordingly - Yes  Patient Active Problem List   Diagnosis     CARDIOVASCULAR SCREENING; LDL GOAL LESS THAN 160     Intermittent asthma, uncomplicated     Past Surgical History:   Procedure Laterality Date     none         Social History     Tobacco Use     Smoking status: Never Smoker     Smokeless tobacco: Never Used   Substance Use Topics     Alcohol use: Yes     Comment: social      Family History   Problem Relation Age of Onset     Unknown/Adopted Mother      Unknown/Adopted Father      Glaucoma No family hx of      Macular Degeneration No family hx of            Reviewed and updated as needed this visit by clinical staff   Tobacco  Allergies  Meds              Reviewed and updated as needed this visit by Provider        Review of Systems   Constitutional: Negative for chills and fever.   HENT: Negative for congestion, ear pain, hearing loss and sore throat.    Eyes: Negative for pain and visual disturbance.   Respiratory: Negative for cough and shortness of breath.    Cardiovascular: Negative for chest pain, palpitations and peripheral edema.   Gastrointestinal: Negative for abdominal pain, constipation, diarrhea, heartburn, hematochezia and nausea.   Genitourinary: Positive for frequency and impotence. Negative for dysuria, genital sores, hematuria, penile discharge and urgency.   Musculoskeletal: Positive for arthralgias and myalgias. Negative for joint swelling.   Skin: Negative for rash.   Neurological: Negative for dizziness, weakness, headaches and paresthesias.   Psychiatric/Behavioral: Negative for mood changes. The patient is nervous/anxious.      OBJECTIVE:   /72 (BP Location: Left arm)   " Pulse 87   Resp 17   Ht 1.895 m (6' 2.61\")   Wt 95.2 kg (209 lb 12.8 oz)   SpO2 97%   BMI 26.50 kg/m      Physical Exam  GENERAL: healthy, alert and no distress  EYES: Eyes grossly normal to inspection, PERRL and conjunctivae and sclerae normal  HENT: ear canals and TM's normal, nose and mouth without ulcers or lesions  NECK: no adenopathy and thyroid normal to palpation  RESP: lungs clear to auscultation - no rales, rhonchi or wheezes  CV: regular rate and rhythm, normal S1 S2, no S3 or S4, no murmur, click or rub, no peripheral edema   ABDOMEN: soft, nontender, no hepatosplenomegaly, no masses and bowel sounds normal  MS: Rt shoulder: FROM, tenderness over biceps tendon.  SKIN: no suspicious lesions or rashes  NEURO: Normal strength and tone, mentation intact and speech normal  PSYCH: mentation appears normal, affect normal/bright    Diagnostic Test Results:  Labs reviewed in Epic    ASSESSMENT/PLAN:   Alexis was seen today for physical.    Diagnoses and all orders for this visit:    Routine history and physical examination of adult  -     Lipid Profile (Chol, Trig, HDL, LDL calc); Future  -     Basic metabolic panel  (Ca, Cl, CO2, Creat, Gluc, K, Na, BUN); Future  -     Lipid Profile (Chol, Trig, HDL, LDL calc)  -     Basic metabolic panel  (Ca, Cl, CO2, Creat, Gluc, K, Na, BUN)    Anal fissure  -     Colorectal Surgery Referral; Future    Acute pain of right shoulder       -   Consistent with biceps tendinosis. Shoulder stretches; if persisting will return and might consider imaging      Mild intermittent asthma, unspecified whether complicated  -     albuterol (PROAIR HFA/PROVENTIL HFA/VENTOLIN HFA) 108 (90 Base) MCG/ACT inhaler; Inhale 2 puffs into the lungs every 6 hours as needed for shortness of breath / dyspnea . No further refills until follow-up appointment    Anxiousness  -     hydrOXYzine (VISTARIL) 25 MG capsule; Take 1-2 capsules (25-50 mg) by mouth 3 times daily as needed for " "anxiety    Erectile dysfunction, unspecified erectile dysfunction type  -     sildenafil (VIAGRA) 100 MG tablet; Take 0.5-1 tablets ( mg) by mouth daily as needed (ED)    Other orders  -     REVIEW OF HEALTH MAINTENANCE PROTOCOL ORDERS    Patient has been advised of split billing requirements and indicates understanding: Yes    COUNSELING:   Reviewed preventive health counseling, as reflected in patient instructions       Regular exercise       Healthy diet/nutrition    Estimated body mass index is 26.5 kg/m  as calculated from the following:    Height as of this encounter: 1.895 m (6' 2.61\").    Weight as of this encounter: 95.2 kg (209 lb 12.8 oz).     Weight management plan: Discussed healthy diet and exercise guidelines    He reports that he has never smoked. He has never used smokeless tobacco.      Counseling Resources:  ATP IV Guidelines  Pooled Cohorts Equation Calculator  FRAX Risk Assessment  ICSI Preventive Guidelines  Dietary Guidelines for Americans, 2010  USDA's MyPlate  ASA Prophylaxis  Lung CA Screening    Jonnie Davis MD  [facility]  "

## 2022-09-28 NOTE — PROGRESS NOTES
Colon and Rectal Surgery Clinic Note    RE: Alexis Ibrahim.  : 1990.  TERESA: 10/14/2022.    Reason for visit: Anal fissure.    HPI: 32-year-old male who presents with a 2-year history of intermittent but persistent anorectal pain associated with bowel movements associated with bright red blood per rectum, mainly upon wiping.  He was diagnosed with an anal fissure in the past and was prescribed nitrates.  He will apply this 4 times but update given palpitations. Having daily bowel movements that are soft and denies excessive straining.  No previous anorectal operations.  Denies fecal incontinence.  No previous colonoscopies.  Denies family history of colorectal cancer or inflammatory bowel disease.    Medical history:  Past Medical History:   Diagnosis Date     CARDIOVASCULAR SCREENING; LDL GOAL LESS THAN 160 2011     Emesis     NOCTURNAL     Uncomplicated asthma 2015    Moderate asthma. Rescue inhaler.       Surgical history:  Past Surgical History:   Procedure Laterality Date     none         Family history:  Family History   Problem Relation Age of Onset     Unknown/Adopted Mother      Unknown/Adopted Father      Glaucoma No family hx of      Macular Degeneration No family hx of        Medications:  Current Outpatient Medications   Medication Sig Dispense Refill     albuterol (PROAIR HFA/PROVENTIL HFA/VENTOLIN HFA) 108 (90 Base) MCG/ACT inhaler Inhale 2 puffs into the lungs every 6 hours as needed for shortness of breath / dyspnea . No further refills until follow-up appointment 18 g 5     diltiazem 2% in PLO gel To anal opening three times daily.  Use a pea-sized amount.  Store at room temperature. 60 g 1     EnovaRX-Lidocaine HCl 5 % cream Apply topically 4 times daily as needed Apply thin layer to perianal skin up to 4 times a day for pain. 120 g 1     fexofenadine (ALLEGRA) 180 MG tablet Take by mouth as needed       hydrOXYzine (VISTARIL) 25 MG capsule Take 1-2 capsules (25-50 mg) by mouth 3 times  daily as needed for anxiety 30 capsule 1     methylcellulose (CITRUCEL) powder Take 1 tablespoon 2 times per day. 850 g 3     polyethylene glycol (MIRALAX) 17 GM/Dose powder Take 17 g (1 capful) by mouth daily 500 g 1     sildenafil (VIAGRA) 100 MG tablet Take 0.5-1 tablets ( mg) by mouth daily as needed (ED) 12 tablet 3     neomycin-polymyxin-dexamethasone (MAXITROL) 3.5-97560-4.1 ophthalmic ointment Apply to left lower eyelid three times a day and into left eye at bedtime. (Patient not taking: No sig reported) 3.5 g 1       Allergies:  Allergies   Allergen Reactions     Cat Hair Extract Shortness Of Breath     Pcn [Penicillins] Nausea and Vomiting       Social history:   Social History     Tobacco Use     Smoking status: Never     Smokeless tobacco: Never   Substance Use Topics     Alcohol use: Yes     Comment: social      Marital status: .    Physical Examination:  /72 (BP Location: Right arm, Patient Position: Sitting, Cuff Size: Adult Regular)   Pulse 88   Wt 216 lb   SpO2 97%   BMI 27.28 kg/m    General: Well hydrated. No acute distress.  Perianal external examination:  Perianal skin: Mild irritation mainly at the posterior aspect of the perianal skin.  Lesions: No.  Eversion of buttocks: There was evidence of an anal fissure. Details: Posterior midline epithelialized fissure.  Skin tags or external hemorrhoids: No.  Digital rectal examination: Was performed.   Sphincter tone: Good.  Palpable lesions: No.  Other: None.  Bimanual examination: was not performed.    Anoscopy: Was performed.   Hemorrhoids: No significant internal hemorrhoids.  Lesions: Scarring at the posterior midline.    Investigations:  None.    Procedures:  None.    ASSESSMENT  32-year-old male with posterior midline anal fissure.  Some improvement with 4 applications of topical nitrates but this was stopped given its side effects.  Symptoms are somewhat mild at this time.  His fissure appears to be epithelialized  currently.  Risks, benefits, and alternatives of operative treatment were thoroughly discussed with the patient, he/she understands these well and agrees to proceed.    PLAN  1.  High-fiber diet.  Start Citrucel 1 tablespoon twice daily.  2.  MiraLAX daily as needed for constipation.  3.  Diltiazem 2% ointment 3 times daily for at least 4-6 weeks.  4.  Lidocaine topical 5% on perianal skin as needed pain.  5.  Turn to clinic in approximately 6-8 weeks with PA or NP for reevaluation.  If not responding at that time we can further discuss Botox injection.    30 minutes spent on the date of encounter (excluding time performing procedures with or without biopsy) performing chart review, history and exam, documentation and further activities as noted above.      Lucian Cruz MD, MSc, FACS, FASCRS.    Chief, Division of Colon & Rectal Surgery  Stanley M. Goldberg, MD Endowed Chair, Colon & Rectal Surgery  Department of Surgery  [facility]      Referring Provider:  Jonnie Davis MD  [address] [place] [address]     Primary Care Provider:  Jonnie Davis

## 2022-10-05 ENCOUNTER — PRE VISIT (OUTPATIENT)
Dept: SURGERY | Facility: CLINIC | Age: 32
End: 2022-10-05

## 2022-10-05 NOTE — TELEPHONE ENCOUNTER
Diagnosis, Referred by & from: Anal Fissure, Jonnie Davis MD   Appt date: 10/14/22   NOTES STATUS DETAILS   OFFICE NOTE from referring provider Internal 07/01/22 Jonnie Davis MD   OFFICE NOTE from other specialist N/A    DISCHARGE SUMMARY from hospital N/A    DISCHARGE REPORT from the ER N/A    OPERATIVE REPORT N/A    MEDICATION LIST Internal    LABS     ANAL PAP/CEA n/a    BIOPSIES/PATHOLOGY RELATED TO DIAGNOSIS n/a    DIAGNOSTIC PROCEDURES     PFC TESTING (from the Pelvic floor center includes Manometry, PDNL, EMG, etc.) n/a    COLONOSCOPY n/a    UPPER ENDOSCOPY (EGD) n/a    FLEX SIGMOIDOSCOPY n/a    ERCP n/a    IMAGING (DISC & REPORT)      CT n/a    MRI N/a    XRAY n/a    ULTRASOUND  (ENDOANAL/ENDORECTAL) 11/20/2015 Renal Complete     Records Requested  10/05/22    Facility    Fax:   Outcome

## 2022-10-07 ASSESSMENT — ENCOUNTER SYMPTOMS: RECTAL PAIN: 1

## 2022-10-14 ENCOUNTER — OFFICE VISIT (OUTPATIENT)
Dept: SURGERY | Facility: CLINIC | Age: 32
End: 2022-10-14
Attending: FAMILY MEDICINE
Payer: COMMERCIAL

## 2022-10-14 VITALS
OXYGEN SATURATION: 97 % | DIASTOLIC BLOOD PRESSURE: 72 MMHG | SYSTOLIC BLOOD PRESSURE: 122 MMHG | BODY MASS INDEX: 27.28 KG/M2 | WEIGHT: 216 LBS | HEART RATE: 88 BPM

## 2022-10-14 DIAGNOSIS — K60.2 ANAL FISSURE: ICD-10-CM

## 2022-10-14 PROCEDURE — 99203 OFFICE O/P NEW LOW 30 MIN: CPT | Performed by: COLON & RECTAL SURGERY

## 2022-10-14 RX ORDER — POLYETHYLENE GLYCOL 3350 17 G/17G
1 POWDER, FOR SOLUTION ORAL DAILY
Qty: 500 G | Refills: 1 | Status: SHIPPED | OUTPATIENT
Start: 2022-10-14

## 2022-10-14 RX ORDER — METHYLCELLULOSE 2 G/19G
POWDER, FOR SOLUTION ORAL
Qty: 850 G | Refills: 3 | Status: SHIPPED | OUTPATIENT
Start: 2022-10-14

## 2022-10-14 NOTE — LETTER
10/14/2022         RE: Alexis Ibrahim  [address] [facility] [address]        Dear Colleague,    Thank you for referring your patient, Alexis Ibrahim, to the [facility] [place]. Please see a copy of my visit note below.    Colon and Rectal Surgery Clinic Note    RE: Alexis Ibrahim.  : 1990.  TERESA: 10/14/2022.    Reason for visit: Anal fissure.    HPI: 32-year-old male who presents with a 2-year history of intermittent but persistent anorectal pain associated with bowel movements associated with bright red blood per rectum, mainly upon wiping.  He was diagnosed with an anal fissure in the past and was prescribed nitrates.  He will apply this 4 times but update given palpitations. Having daily bowel movements that are soft and denies excessive straining.  No previous anorectal operations.  Denies fecal incontinence.  No previous colonoscopies.  Denies family history of colorectal cancer or inflammatory bowel disease.    Medical history:  Past Medical History:   Diagnosis Date     CARDIOVASCULAR SCREENING; LDL GOAL LESS THAN 160 2011     Emesis     NOCTURNAL     Uncomplicated asthma 2015    Moderate asthma. Rescue inhaler.       Surgical history:  Past Surgical History:   Procedure Laterality Date     none         Family history:  Family History   Problem Relation Age of Onset     Unknown/Adopted Mother      Unknown/Adopted Father      Glaucoma No family hx of      Macular Degeneration No family hx of        Medications:  Current Outpatient Medications   Medication Sig Dispense Refill     albuterol (PROAIR HFA/PROVENTIL HFA/VENTOLIN HFA) 108 (90 Base) MCG/ACT inhaler Inhale 2 puffs into the lungs every 6 hours as needed for shortness of breath / dyspnea . No further refills until follow-up appointment 18 g 5     diltiazem 2% in PLO gel To anal opening three times daily.  Use a pea-sized amount.  Store at room temperature. 60 g 1     EnovaRX-Lidocaine HCl 5 % cream  Apply topically 4 times daily as needed Apply thin layer to perianal skin up to 4 times a day for pain. 120 g 1     fexofenadine (ALLEGRA) 180 MG tablet Take by mouth as needed       hydrOXYzine (VISTARIL) 25 MG capsule Take 1-2 capsules (25-50 mg) by mouth 3 times daily as needed for anxiety 30 capsule 1     methylcellulose (CITRUCEL) powder Take 1 tablespoon 2 times per day. 850 g 3     polyethylene glycol (MIRALAX) 17 GM/Dose powder Take 17 g (1 capful) by mouth daily 500 g 1     sildenafil (VIAGRA) 100 MG tablet Take 0.5-1 tablets ( mg) by mouth daily as needed (ED) 12 tablet 3     neomycin-polymyxin-dexamethasone (MAXITROL) 3.5-03969-3.1 ophthalmic ointment Apply to left lower eyelid three times a day and into left eye at bedtime. (Patient not taking: No sig reported) 3.5 g 1       Allergies:  Allergies   Allergen Reactions     Cat Hair Extract Shortness Of Breath     Pcn [Penicillins] Nausea and Vomiting       Social history:   Social History     Tobacco Use     Smoking status: Never     Smokeless tobacco: Never   Substance Use Topics     Alcohol use: Yes     Comment: social      Marital status: .    Physical Examination:  /72 (BP Location: Right arm, Patient Position: Sitting, Cuff Size: Adult Regular)   Pulse 88   Wt 216 lb   SpO2 97%   BMI 27.28 kg/m    General: Well hydrated. No acute distress.  Perianal external examination:  Perianal skin: Mild irritation mainly at the posterior aspect of the perianal skin.  Lesions: No.  Eversion of buttocks: There was evidence of an anal fissure. Details: Posterior midline epithelialized fissure.  Skin tags or external hemorrhoids: No.  Digital rectal examination: Was performed.   Sphincter tone: Good.  Palpable lesions: No.  Other: None.  Bimanual examination: was not performed.    Anoscopy: Was performed.   Hemorrhoids: No significant internal hemorrhoids.  Lesions: Scarring at the posterior  midline.    Investigations:  None.    Procedures:  None.    ASSESSMENT  32-year-old male with posterior midline anal fissure.  Some improvement with 4 applications of topical nitrates but this was stopped given its side effects.  Symptoms are somewhat mild at this time.  His fissure appears to be epithelialized currently.  Risks, benefits, and alternatives of operative treatment were thoroughly discussed with the patient, he/she understands these well and agrees to proceed.    PLAN  1.  High-fiber diet.  Start Citrucel 1 tablespoon twice daily.  2.  MiraLAX daily as needed for constipation.  3.  Diltiazem 2% ointment 3 times daily for at least 4-6 weeks.  4.  Lidocaine topical 5% on perianal skin as needed pain.  5.  Turn to clinic in approximately 6-8 weeks with PA or NP for reevaluation.  If not responding at that time we can further discuss Botox injection.    30 minutes spent on the date of encounter (excluding time performing procedures with or without biopsy) performing chart review, history and exam, documentation and further activities as noted above.      Lucian Cruz MD, MSc, FACS, FASCRS.    Chief, Division of Colon & Rectal Surgery  Stanley M. Goldberg, MD Endowed Chair, Colon & Rectal Surgery  Department of Surgery  [facility]      Referring Provider:  Jonnie Davis MD  [address] [place] [address]     Primary Care Provider:  Jonnie Davis      Again, thank you for allowing me to participate in the care of your patient.        Sincerely,        Lucian Cruz MD

## 2022-10-14 NOTE — PATIENT INSTRUCTIONS
Follow up:    Please call with any questions or concerns regarding your clinic visit today.  1. Diltiazem ointment 2% to be applied 3 times daily for 8 weeks. This goes through our compounding pharmacy. They will call you when it's ready to check for co-pays then mail it to your house   2. Fiber supplement such as Metamucil, Citrucel, or Benefiber once to twice a day  3. MiraLax or Milk of Magnesia if still constipated  4. Drink 10 glasses of water a day  5. Tylenol, ibuprofen, and warm tub baths/sitz baths for pain  6. Follow up in 8 weeks. Follow up sooner if symptoms do not improve after 4 weeks.    TRINI Sapp [phone]

## 2022-10-14 NOTE — NURSING NOTE
Chief Complaint   Patient presents with     New Patient     Anal Fissure       Vitals:    10/14/22 1459   BP: 122/72   BP Location: Right arm   Patient Position: Sitting   Cuff Size: Adult Regular   Pulse: 88   SpO2: 97%   Weight: 98 kg (216 lb)       Body mass index is 27.28 kg/m .      DUSTIN Lujan

## 2022-12-05 ENCOUNTER — MYC MEDICAL ADVICE (OUTPATIENT)
Dept: SURGERY | Facility: CLINIC | Age: 32
End: 2022-12-05

## 2022-12-12 ENCOUNTER — OFFICE VISIT (OUTPATIENT)
Dept: SURGERY | Facility: CLINIC | Age: 32
End: 2022-12-12
Payer: COMMERCIAL

## 2022-12-12 VITALS — OXYGEN SATURATION: 98 % | SYSTOLIC BLOOD PRESSURE: 125 MMHG | DIASTOLIC BLOOD PRESSURE: 79 MMHG | HEART RATE: 80 BPM

## 2022-12-12 DIAGNOSIS — K60.2 ANAL FISSURE: Primary | ICD-10-CM

## 2022-12-12 DIAGNOSIS — L29.0 PERIANAL IRRITATION: ICD-10-CM

## 2022-12-12 PROCEDURE — 99212 OFFICE O/P EST SF 10 MIN: CPT

## 2022-12-12 ASSESSMENT — PAIN SCALES - GENERAL: PAINLEVEL: NO PAIN (0)

## 2022-12-12 NOTE — NURSING NOTE
Chief Complaint   Patient presents with     Rectal Problem       Vitals:    12/12/22 1426   BP: 125/79   BP Location: Left arm   Patient Position: Sitting   Cuff Size: Adult Large   Pulse: 80   SpO2: 98%       There is no height or weight on file to calculate BMI.    Garry Bermudez EMT-P

## 2022-12-12 NOTE — LETTER
2022       RE: Alexis Ibrahim  [address] [facility] [address]     Dear Colleague,    Thank you for referring your patient, Alexis Ibrahim, to the [facility] RECTAL SURGERY CLINIC [place] [facility]. Please see a copy of my visit note below.    Colon and Rectal Surgery Follow-Up Clinic Note    RE: Alexis Ibrahim  : 1990  TERESA: 2022    Alexis Ibrahim is a very pleasant 32 year old male here for follow-up of anal fissure.    Interval history: Duc saw Dr. Cruz about 2 months ago for a posterior midline anal fissure. He was given topical diltiazem and lidocaine for the fissure and returns today for a recheck. Duc is doing much better. He still has intermittent pain with bowel movements but it is much milder. No side effects from the diltiazem. He notes that there was a delay in getting the diltiazem, so he has only been using it for 5-6 weeks. He takes a fiber supplement 1-2 times weekly. He generally has soft bowel movements.    He just bought a house in [place] last month.    Physical Examination: Exam was chaperoned by Garry Bermudez, EMT-P   /79 (BP Location: Left arm, Patient Position: Sitting, Cuff Size: Adult Large)   Pulse 80   SpO2 98%   General: alert, oriented, in no acute distress, sitting comfortably  HEENT: moist mucous membranes  Perianal external examination:  Perianal skin: mild maceration of the posterior perianal skin.  Lesions: No evidence of an external lesion, nodularity, or induration in the perianal region.  Eversion of buttocks: There was evidence of an anal fissure. Details: posterior midline anal fissure.  Skin tags or external hemorrhoids: No.    Digital rectal examination: Was deferred.    Anoscopy: Was deferred.    Assessment/Plan: 32 year old male with posterior midline anal fissure and perianal irritation. Duc is doing well and his fissure appears to be healing  well. Continue the diltiazem for another month. Discussed taking a fiber supplement daily, as this will help with the fissure and also the perianal irritation. Gave him samples of Calmoseptine as well. We did discuss Botox as a possible next step in case his fissure is refractory to diltiazem. We can do this in clinic or in the OR. He will reach out if he wants to proceed with this after trying the diltiazem for another month. Otherwise follow up as needed. Patient's questions were answered to his stated satisfaction and he is in agreement with this plan.     Medical history:  Past Medical History:   Diagnosis Date     CARDIOVASCULAR SCREENING; LDL GOAL LESS THAN 160 05/02/2011     Emesis     NOCTURNAL     Uncomplicated asthma 2015    Moderate asthma. Rescue inhaler.       Surgical history:  Past Surgical History:   Procedure Laterality Date     none         Problem list:    Patient Active Problem List    Diagnosis Date Noted     Intermittent asthma, uncomplicated 08/02/2017     Priority: Medium     CARDIOVASCULAR SCREENING; LDL GOAL LESS THAN 160 05/02/2011     Priority: Medium       Medications:  Current Outpatient Medications   Medication Sig Dispense Refill     albuterol (PROAIR HFA/PROVENTIL HFA/VENTOLIN HFA) 108 (90 Base) MCG/ACT inhaler Inhale 2 puffs into the lungs every 6 hours as needed for shortness of breath / dyspnea . No further refills until follow-up appointment 18 g 5     diltiazem 2% in PLO gel To anal opening three times daily.  Use a pea-sized amount.  Store at room temperature. 60 g 1     EnovaRX-Lidocaine HCl 5 % cream Apply topically 4 times daily as needed Apply thin layer to perianal skin up to 4 times a day for pain. 120 g 1     fexofenadine (ALLEGRA) 180 MG tablet Take by mouth as needed       hydrOXYzine (VISTARIL) 25 MG capsule Take 1-2 capsules (25-50 mg) by mouth 3 times daily as needed for anxiety 30 capsule 1     methylcellulose (CITRUCEL) powder Take 1 tablespoon 2 times per day.  850 g 3     polyethylene glycol (MIRALAX) 17 GM/Dose powder Take 17 g (1 capful) by mouth daily 500 g 1     sildenafil (VIAGRA) 100 MG tablet Take 0.5-1 tablets ( mg) by mouth daily as needed (ED) 12 tablet 3     neomycin-polymyxin-dexamethasone (MAXITROL) 3.5-45414-6.1 ophthalmic ointment Apply to left lower eyelid three times a day and into left eye at bedtime. (Patient not taking: Reported on 7/1/2022) 3.5 g 1       Allergies:  Allergies   Allergen Reactions     Cat Hair Extract Shortness Of Breath     Pcn [Penicillins] Nausea and Vomiting       Family history:  Family History   Problem Relation Age of Onset     Unknown/Adopted Mother      Unknown/Adopted Father      Glaucoma No family hx of      Macular Degeneration No family hx of        Social history:  Social History     Tobacco Use     Smoking status: Never     Smokeless tobacco: Never   Substance Use Topics     Alcohol use: Yes     Comment: social      Marital status: .    Nursing Notes:   Garry Bermudez EMT  12/12/2022  2:28 PM  Signed  Chief Complaint   Patient presents with     Rectal Problem       Vitals:    12/12/22 1426   BP: 125/79   BP Location: Left arm   Patient Position: Sitting   Cuff Size: Adult Large   Pulse: 80   SpO2: 98%       There is no height or weight on file to calculate BMI.    Garry Bermudez EMT-P      15 minutes spent on the date of encounter (excluding time performing procedures) performing chart review, history and exam, documentation and further activities as noted above.     -----------------------------------------------------        Again, thank you for allowing me to participate in the care of your patient.      Sincerely,    Devika Desir PA-C

## 2022-12-12 NOTE — PROGRESS NOTES
Colon and Rectal Surgery Follow-Up Clinic Note    RE: Alexis Ibrahim  : 1990  TERESA: 2022    Alexis Ibrahim is a very pleasant 32 year old male here for follow-up of anal fissure.    Interval history: Duc saw Dr. Cruz about 2 months ago for a posterior midline anal fissure. He was given topical diltiazem and lidocaine for the fissure and returns today for a recheck. Duc is doing much better. He still has intermittent pain with bowel movements but it is much milder. No side effects from the diltiazem. He notes that there was a delay in getting the diltiazem, so he has only been using it for 5-6 weeks. He takes a fiber supplement 1-2 times weekly. He generally has soft bowel movements.    He just bought a house in [place] last month.    Physical Examination: Exam was chaperoned by Garry Bermudez, EMT-P   /79 (BP Location: Left arm, Patient Position: Sitting, Cuff Size: Adult Large)   Pulse 80   SpO2 98%   General: alert, oriented, in no acute distress, sitting comfortably  HEENT: moist mucous membranes  Perianal external examination:  Perianal skin: mild maceration of the posterior perianal skin.  Lesions: No evidence of an external lesion, nodularity, or induration in the perianal region.  Eversion of buttocks: There was evidence of an anal fissure. Details: posterior midline anal fissure.  Skin tags or external hemorrhoids: No.    Digital rectal examination: Was deferred.    Anoscopy: Was deferred.    Assessment/Plan: 32 year old male with posterior midline anal fissure and perianal irritation. Duc is doing well and his fissure appears to be healing well. Continue the diltiazem for another month. Discussed taking a fiber supplement daily, as this will help with the fissure and also the perianal irritation. Gave him samples of Calmoseptine as well. We did discuss Botox as a possible next step in case his fissure is refractory to diltiazem. We can do this in clinic or in the OR.  He will reach out if he wants to proceed with this after trying the diltiazem for another month. Otherwise follow up as needed. Patient's questions were answered to his stated satisfaction and he is in agreement with this plan.     Medical history:  Past Medical History:   Diagnosis Date     CARDIOVASCULAR SCREENING; LDL GOAL LESS THAN 160 05/02/2011     Emesis     NOCTURNAL     Uncomplicated asthma 2015    Moderate asthma. Rescue inhaler.       Surgical history:  Past Surgical History:   Procedure Laterality Date     none         Problem list:    Patient Active Problem List    Diagnosis Date Noted     Intermittent asthma, uncomplicated 08/02/2017     Priority: Medium     CARDIOVASCULAR SCREENING; LDL GOAL LESS THAN 160 05/02/2011     Priority: Medium       Medications:  Current Outpatient Medications   Medication Sig Dispense Refill     albuterol (PROAIR HFA/PROVENTIL HFA/VENTOLIN HFA) 108 (90 Base) MCG/ACT inhaler Inhale 2 puffs into the lungs every 6 hours as needed for shortness of breath / dyspnea . No further refills until follow-up appointment 18 g 5     diltiazem 2% in PLO gel To anal opening three times daily.  Use a pea-sized amount.  Store at room temperature. 60 g 1     EnovaRX-Lidocaine HCl 5 % cream Apply topically 4 times daily as needed Apply thin layer to perianal skin up to 4 times a day for pain. 120 g 1     fexofenadine (ALLEGRA) 180 MG tablet Take by mouth as needed       hydrOXYzine (VISTARIL) 25 MG capsule Take 1-2 capsules (25-50 mg) by mouth 3 times daily as needed for anxiety 30 capsule 1     methylcellulose (CITRUCEL) powder Take 1 tablespoon 2 times per day. 850 g 3     polyethylene glycol (MIRALAX) 17 GM/Dose powder Take 17 g (1 capful) by mouth daily 500 g 1     sildenafil (VIAGRA) 100 MG tablet Take 0.5-1 tablets ( mg) by mouth daily as needed (ED) 12 tablet 3     neomycin-polymyxin-dexamethasone (MAXITROL) 3.5-35052-7.1 ophthalmic ointment Apply to left lower eyelid three times  a day and into left eye at bedtime. (Patient not taking: Reported on 7/1/2022) 3.5 g 1       Allergies:  Allergies   Allergen Reactions     Cat Hair Extract Shortness Of Breath     Pcn [Penicillins] Nausea and Vomiting       Family history:  Family History   Problem Relation Age of Onset     Unknown/Adopted Mother      Unknown/Adopted Father      Glaucoma No family hx of      Macular Degeneration No family hx of        Social history:  Social History     Tobacco Use     Smoking status: Never     Smokeless tobacco: Never   Substance Use Topics     Alcohol use: Yes     Comment: social      Marital status: .    Nursing Notes:   Garry Bermudez, EMT  12/12/2022  2:28 PM  Signed  Chief Complaint   Patient presents with     Rectal Problem       Vitals:    12/12/22 1426   BP: 125/79   BP Location: Left arm   Patient Position: Sitting   Cuff Size: Adult Large   Pulse: 80   SpO2: 98%       There is no height or weight on file to calculate BMI.    Garry Bermudez EMT-P         15 minutes spent on the date of encounter (excluding time performing procedures) performing chart review, history and exam, documentation and further activities as noted above.     -----------------------------------------------------  Devika Desir PA-C  Colon and Rectal Surgery  [facility]

## 2023-01-02 ENCOUNTER — MYC MEDICAL ADVICE (OUTPATIENT)
Dept: FAMILY MEDICINE | Facility: CLINIC | Age: 33
End: 2023-01-02
Payer: COMMERCIAL

## 2023-01-06 NOTE — TELEPHONE ENCOUNTER
Provider Response to 2nd Level Triage Request    I have reviewed the RN documentation. My recommendation is:  Best addressed by PCP/specialist Let patient schedule appointment with me, not urgent

## 2023-01-08 ENCOUNTER — HEALTH MAINTENANCE LETTER (OUTPATIENT)
Age: 33
End: 2023-01-08

## 2023-01-18 ASSESSMENT — ASTHMA QUESTIONNAIRES
QUESTION_4 LAST FOUR WEEKS HOW OFTEN HAVE YOU USED YOUR RESCUE INHALER OR NEBULIZER MEDICATION (SUCH AS ALBUTEROL): TWO OR THREE TIMES PER WEEK
QUESTION_5 LAST FOUR WEEKS HOW WOULD YOU RATE YOUR ASTHMA CONTROL: WELL CONTROLLED
ACT_TOTALSCORE: 21
QUESTION_3 LAST FOUR WEEKS HOW OFTEN DID YOUR ASTHMA SYMPTOMS (WHEEZING, COUGHING, SHORTNESS OF BREATH, CHEST TIGHTNESS OR PAIN) WAKE YOU UP AT NIGHT OR EARLIER THAN USUAL IN THE MORNING: NOT AT ALL
ACT_TOTALSCORE: 21
QUESTION_2 LAST FOUR WEEKS HOW OFTEN HAVE YOU HAD SHORTNESS OF BREATH: ONCE OR TWICE A WEEK
QUESTION_1 LAST FOUR WEEKS HOW MUCH OF THE TIME DID YOUR ASTHMA KEEP YOU FROM GETTING AS MUCH DONE AT WORK, SCHOOL OR AT HOME: NONE OF THE TIME

## 2023-01-24 ENCOUNTER — OFFICE VISIT (OUTPATIENT)
Dept: FAMILY MEDICINE | Facility: CLINIC | Age: 33
End: 2023-01-24
Payer: COMMERCIAL

## 2023-01-24 VITALS
TEMPERATURE: 98.4 F | HEART RATE: 74 BPM | BODY MASS INDEX: 28.34 KG/M2 | WEIGHT: 220.8 LBS | RESPIRATION RATE: 18 BRPM | SYSTOLIC BLOOD PRESSURE: 122 MMHG | HEIGHT: 74 IN | OXYGEN SATURATION: 97 % | DIASTOLIC BLOOD PRESSURE: 80 MMHG

## 2023-01-24 DIAGNOSIS — N50.819 PAIN IN TESTICLE, UNSPECIFIED LATERALITY: Primary | ICD-10-CM

## 2023-01-24 LAB
ALBUMIN UR-MCNC: NEGATIVE MG/DL
APPEARANCE UR: CLEAR
BILIRUB UR QL STRIP: NEGATIVE
COLOR UR AUTO: YELLOW
GLUCOSE UR STRIP-MCNC: NEGATIVE MG/DL
HGB UR QL STRIP: NEGATIVE
KETONES UR STRIP-MCNC: NEGATIVE MG/DL
LEUKOCYTE ESTERASE UR QL STRIP: NEGATIVE
NITRATE UR QL: NEGATIVE
PH UR STRIP: 5.5 [PH] (ref 5–7)
SP GR UR STRIP: 1.02 (ref 1–1.03)
UROBILINOGEN UR STRIP-ACNC: 0.2 E.U./DL

## 2023-01-24 PROCEDURE — 99213 OFFICE O/P EST LOW 20 MIN: CPT | Performed by: FAMILY MEDICINE

## 2023-01-24 PROCEDURE — 81003 URINALYSIS AUTO W/O SCOPE: CPT | Performed by: FAMILY MEDICINE

## 2023-01-24 ASSESSMENT — PAIN SCALES - GENERAL: PAINLEVEL: MODERATE PAIN (4)

## 2023-01-24 NOTE — PROGRESS NOTES
"  Assessment & Plan     Pain in testicle, unspecified laterality  Nonspecific testicular pain, differentials include varicocele, epididymal cysts, epididymoorchitis, tumor, will evaluate with urinalysis and scrotal ultrasound.  - UA Macro with Reflex to Micro and Culture - lab collect; Future  - US Testicular & Scrotum w Doppler Ltd; Future  - UA Macro with Reflex to Micro and Culture - lab collect       BMI:   Estimated body mass index is 28.04 kg/m  as calculated from the following:    Height as of this encounter: 1.89 m (6' 2.41\").    Weight as of this encounter: 100.2 kg (220 lb 12.8 oz).   Weight management plan: Discussed healthy diet and exercise guidelines    Return for follow up with results.    Jonnie Davis MD  [facility] MARC Xavier is a 32 year old, presenting for the following health issues:  Pain      Dull lingering pain in the left side x 1 month   Pain comes and goes but been pretty constant lately; more prominent when lays down   No aggravating factor; plays basketball   No dysuria   Sexually active 1 female partner.   No noticeable swelling or pain with touch    Past testicular US:   ULTRASOUND TESTICULAR AND SCROTUM  2/14/2018 1:24 PM   HISTORY:  Testicular pain, left.     FINDINGS: The right testicle measured 5.5 x 2.4 x 3.1 cm and the left  testicle 4.1 x 2.2 x 3.6 cm. No testicular mass or calcification was  demonstrated. Doppler waveform analysis demonstrated normal blood flow  to both testicles, less well seen on the right. Extratesticular  structures appeared within normal limits.     History of Present Illness       Reason for visit:  Testicle pain. Had this a few years ago as well.    He eats 2-3 servings of fruits and vegetables daily.He consumes 0 sweetened beverage(s) daily.He exercises with enough effort to increase his heart rate 30 to 60 minutes per day.  He exercises with enough effort to increase his heart rate 4 days per week.   He is taking " "medications regularly.     Review of Systems   Constitutional, HEENT, cardiovascular, pulmonary and gi systems are negative, except as otherwise noted.      Objective    /80 (BP Location: Right arm, Cuff Size: Adult Regular)   Pulse 74   Temp 98.4  F (36.9  C) (Oral)   Resp 18   Ht 1.89 m (6' 2.41\")   Wt 100.2 kg (220 lb 12.8 oz)   SpO2 97%   BMI 28.04 kg/m    Body mass index is 28.04 kg/m .  Physical Exam   GENERAL: healthy, alert and no distress  RESP: lungs clear to auscultation - no rales, rhonchi or wheezes  CV: regular rate and rhythm, no murmur, click or rub, no peripheral edema    (male): normal male genitalia without lesions or urethral discharge, no hernia  MS: no gross musculoskeletal defects noted, no edema    "

## 2023-01-24 NOTE — PROGRESS NOTES
"  Marielt: 1/2/23  1. SYMPTOM: \"What's the main symptom you're concerned about?\" The consistent pain is what I m most concerned about.       2. LOCATION: \"Which testicle?\" My left.      3. PAIN:How would you rate the pain?  5 or 6.      4. URINE: \"Any difficulty passing urine?\" No difficulty.      5. OTHER SYMPTOMS: \"Do you have any other symptoms?\" No consistent other symptoms.     Answers for HPI/ROS submitted by the patient on 1/18/2023  What is the reason for your visit today? : Testicle pain. Had this a few years ago as well.  How many servings of fruits and vegetables do you eat daily?: 2-3  On average, how many sweetened beverages do you drink each day (Examples: soda, juice, sweet tea, etc.  Do NOT count diet or artificially sweetened beverages)?: 0  How many minutes a day do you exercise enough to make your heart beat faster?: 30 to 60  How many days a week do you exercise enough to make your heart beat faster?: 4  How many days per week do you miss taking your medication?: 0      "

## 2023-01-30 ENCOUNTER — ANCILLARY PROCEDURE (OUTPATIENT)
Dept: ULTRASOUND IMAGING | Facility: CLINIC | Age: 33
End: 2023-01-30
Attending: FAMILY MEDICINE
Payer: COMMERCIAL

## 2023-01-30 DIAGNOSIS — N50.819 PAIN IN TESTICLE, UNSPECIFIED LATERALITY: ICD-10-CM

## 2023-01-30 PROCEDURE — 76870 US EXAM SCROTUM: CPT | Mod: TC | Performed by: RADIOLOGY

## 2023-01-30 PROCEDURE — 93976 VASCULAR STUDY: CPT | Mod: TC | Performed by: RADIOLOGY

## 2023-02-27 ENCOUNTER — VIRTUAL VISIT (OUTPATIENT)
Dept: FAMILY MEDICINE | Facility: CLINIC | Age: 33
End: 2023-02-27
Payer: COMMERCIAL

## 2023-02-27 DIAGNOSIS — N50.819 PAIN IN TESTICLE, UNSPECIFIED LATERALITY: Primary | ICD-10-CM

## 2023-02-27 PROCEDURE — 99213 OFFICE O/P EST LOW 20 MIN: CPT | Mod: VID | Performed by: FAMILY MEDICINE

## 2023-02-27 NOTE — PROGRESS NOTES
Duc is a 32 year old who is being evaluated via a billable video visit.      How would you like to obtain your AVS? MyChart  If the video visit is dropped, the invitation should be resent by: Text to cell phone: [phone]  Will anyone else be joining your video visit? No       Assessment & Plan     Pain in testicle, unspecified laterality  Denying ongoing testicular pain, been persistent lately, had ultrasound evaluation which was normal, because of persistent pain empirically treated with an antibiotic.  Still continues to experience the pain discussed evaluation by urology.  - Adult Urology  Referral; Future      No follow-ups on file.    Jonnie Davis MD  [facility]    Subjective   Duc is a 32 year old, presenting for the following health issues:  RECHECK (Last 1-24-23)    With patient on January 24 for testicular pain, ultrasound was negative, empirically treated with an antibiotic.  Took the doxycycline for 14 days but did not seem to have  Still has pain, has to ibuprofen twice daily  The patient is a constant nagging sensation in the left scrotum  No swelling or skin changes noted.    History of Present Illness       Reason for visit:  Continued testicle pain    He eats 2-3 servings of fruits and vegetables daily.He consumes 0 sweetened beverage(s) daily.He exercises with enough effort to increase his heart rate 30 to 60 minutes per day.  He exercises with enough effort to increase his heart rate 4 days per week.   He is taking medications regularly.       Review of Systems     Objective           Vitals:  No vitals were obtained today due to virtual visit.    Physical Exam         Video-Visit Details    Type of service:  Video Visit     Originating Location (pt. Location): Home   Distant Location (provider location):  On-site  Platform used for Video Visit: [employer] ([employer])

## 2023-03-07 ENCOUNTER — OFFICE VISIT (OUTPATIENT)
Dept: UROLOGY | Facility: CLINIC | Age: 33
End: 2023-03-07
Payer: COMMERCIAL

## 2023-03-07 VITALS — DIASTOLIC BLOOD PRESSURE: 64 MMHG | HEART RATE: 74 BPM | OXYGEN SATURATION: 97 % | SYSTOLIC BLOOD PRESSURE: 120 MMHG

## 2023-03-07 DIAGNOSIS — R10.30 INGUINAL PAIN, UNSPECIFIED LATERALITY: ICD-10-CM

## 2023-03-07 PROCEDURE — 99203 OFFICE O/P NEW LOW 30 MIN: CPT | Performed by: UROLOGY

## 2023-03-07 NOTE — PROGRESS NOTES
S: Patient is a pleasant 32-year-old male who was requested to be seen by Dr. Jonnie Davis for a consultation with regard to patient's left groin pain.  Patient complains of left groin pain for about 3 months.  It is a dull achy pain and is constant.  It is more noticeable at night but there is no obvious aggravating or relieving factors.  He has no bowel or urinary complaints.  He has similar issue in the past several years ago.  Recent scrotal ultrasound was unremarkable.  He did receive some antibiotics for possible epididymitis which did not solve the problem.  He plays basketball about once a week.  Current Outpatient Medications   Medication Sig Dispense Refill     albuterol (PROAIR HFA/PROVENTIL HFA/VENTOLIN HFA) 108 (90 Base) MCG/ACT inhaler Inhale 2 puffs into the lungs every 6 hours as needed for shortness of breath / dyspnea . No further refills until follow-up appointment 18 g 5     diltiazem 2% in PLO gel To anal opening three times daily.  Use a pea-sized amount.  Store at room temperature. 60 g 1     EnovaRX-Lidocaine HCl 5 % cream Apply topically 4 times daily as needed Apply thin layer to perianal skin up to 4 times a day for pain. 120 g 1     fexofenadine (ALLEGRA) 180 MG tablet Take by mouth as needed       hydrOXYzine (VISTARIL) 25 MG capsule Take 1-2 capsules (25-50 mg) by mouth 3 times daily as needed for anxiety 30 capsule 1     methylcellulose (CITRUCEL) powder Take 1 tablespoon 2 times per day. 850 g 3     polyethylene glycol (MIRALAX) 17 GM/Dose powder Take 17 g (1 capful) by mouth daily 500 g 1     sildenafil (VIAGRA) 100 MG tablet Take 0.5-1 tablets ( mg) by mouth daily as needed (ED) 12 tablet 3     doxycycline hyclate (VIBRAMYCIN) 100 MG capsule Take 1 capsule (100 mg) by mouth 2 times daily (Patient not taking: Reported on 2/27/2023) 30 capsule 0     neomycin-polymyxin-dexamethasone (MAXITROL) 3.5-29987-2.1 ophthalmic ointment Apply to left lower eyelid three times a day and  into left eye at bedtime. (Patient not taking: Reported on 7/1/2022) 3.5 g 1     Allergies   Allergen Reactions     Cat Hair Extract Shortness Of Breath     Pcn [Penicillins] Nausea and Vomiting     Past Medical History:   Diagnosis Date     CARDIOVASCULAR SCREENING; LDL GOAL LESS THAN 160 05/02/2011     Emesis     NOCTURNAL     Uncomplicated asthma 2015    Moderate asthma. Rescue inhaler.     Past Surgical History:   Procedure Laterality Date     none        Family History   Problem Relation Age of Onset     Unknown/Adopted Mother      Unknown/Adopted Father      Glaucoma No family hx of      Macular Degeneration No family hx of      Social History     Socioeconomic History     Marital status:      Spouse name: None     Number of children: None     Years of education: 14     Highest education level: None   Occupational History     Employer: STUDENT   Tobacco Use     Smoking status: Never     Smokeless tobacco: Never   Substance and Sexual Activity     Alcohol use: Yes     Comment: social      Drug use: No     Sexual activity: Yes     Partners: Female     Birth control/protection: Pull-out method, Condom, I.U.D.   Other Topics Concern     Parent/sibling w/ CABG, MI or angioplasty before 65F 55M? No   Social History Narrative    Currently residing in a dorm at college.       REVIEW OF SYSTEMS  =================  C: NEGATIVE for fever, chills, change in weight  I: NEGATIVE for worrisome rashes, moles or lesions  E/M: NEGATIVE for ear, mouth and throat problems  R: NEGATIVE for significant cough or SHORTNESS OF BREATH  CV:  NEGATIVE for chest pain, palpitations or peripheral edema  GI: NEGATIVE for nausea, abdominal pain, heartburn, or change in bowel habits  NEURO: NEGATIVE numbness/weakness  : see HPI  PSYCH: NEGATIVE depression/anxiety  LYmph: no new enlarged lymph nodes  Ortho: no new trauma/movements      Physical Exam:  /64 (BP Location: Left arm, Patient Position: Chair, Cuff Size: Adult Large)    Pulse 74   SpO2 97%    Patient is pleasant, in no acute distress, good general condition.  Heart:  negative, PMI normal  Lung: no evidence of respiratory distress    Abdomen: Soft, nondistended, non tender. No masses. No rebound or guarding.   Exam: Penis no discharge.  Testes no masses.  No scrotal skin lesion.  No inguinal hernia.  Skin: Warm and dry.  No redness.  Neuro: grossly normal  Musculaskeletal: moving all extremities  Psych normal mood and affect  Musculoskeletal  moving all extremities  Hematologic/Lymphatic/Immunologic: normal ant/post cervical, axillary, supraclavicular and inguinal nodes    Assessment/Plan:     Pleasant 32-year-old male with left groin pain most likely due to pelvic muscle issues.  Anatomy of this region discussed with patient.  He was reassured.  He should rest and gives some time for this to go away.  Follow-up with me if not better in several months.

## 2023-04-17 ENCOUNTER — OFFICE VISIT (OUTPATIENT)
Dept: URGENT CARE | Facility: URGENT CARE | Age: 33
End: 2023-04-17
Payer: COMMERCIAL

## 2023-04-17 VITALS
HEART RATE: 86 BPM | BODY MASS INDEX: 28.56 KG/M2 | WEIGHT: 224.9 LBS | OXYGEN SATURATION: 97 % | SYSTOLIC BLOOD PRESSURE: 119 MMHG | TEMPERATURE: 98.1 F | DIASTOLIC BLOOD PRESSURE: 68 MMHG | RESPIRATION RATE: 20 BRPM

## 2023-04-17 DIAGNOSIS — R07.0 THROAT PAIN: Primary | ICD-10-CM

## 2023-04-17 LAB
DEPRECATED S PYO AG THROAT QL EIA: NEGATIVE
GROUP A STREP BY PCR: NOT DETECTED

## 2023-04-17 PROCEDURE — 99213 OFFICE O/P EST LOW 20 MIN: CPT

## 2023-04-17 PROCEDURE — 87651 STREP A DNA AMP PROBE: CPT

## 2023-04-17 NOTE — PROGRESS NOTES
ASSESSMENT:   (R07.0) Throat pain  (primary encounter diagnosis)  Plan: Streptococcus A Rapid Screen w/Reflex to PCR -         Clinic Collect    PLAN:  Informed the patient of the strep test is negative.  We discussed that he can use Tylenol and/or ibuprofen as needed for pain with maximum dose Tylenol being 4000 mg in a 24-hour period of time and to take ibuprofen with food to avoid upset stomach.  We also discussed trying warm salt water gargles, hot/warm water or tea with honey under lemon and or Cepacol lozenges or spray for the sore throat.  Informed the patient to follow-up with ENT for further evaluation and treatment.  Patient acknowledged his understanding of the above plan.    The use of Dragon/Evernoteation services may have been used to construct the content in this note; any grammatical or spelling errors are non-intentional. Please contact the author of this note directly if you are in need of any clarification.      Kj Deleon, APRN CNP      SUBJECTIVE:   Alexis Ibrahim  is a 33 year old male who is here today because of: Sore Throat.  The patient has had symptoms of earache.   Onset of symptoms was 2 weeks ago. Course of illness is worsening.  Patient denies exposure to illness at home or work.   Patient denies fever, cough, nasal congestion/runny nose, vomiting and diarrhea  Treatment measures tried include ibuprofen and OTC cough medicine.    At home COVID test negative.    ROS:  Negative except noted above.      OBJECTIVE:   /68   Pulse 86   Temp 98.1  F (36.7  C) (Tympanic)   Resp 20   Wt 102 kg (224 lb 14.4 oz)   SpO2 97%   BMI 28.56 kg/m    General: healthy, alert and no distress  Eyes - conjunctivae clear.  Ears - External ears normal. Canals clear. TM's normal.  Nose/Sinuses - Nares normal.Mucosa normal. No drainage or sinus tenderness.  Oropharynx - Lips, mucosa, tonsils, oropharynx and tongue normal.  Neck - Neck supple; no lymphadenopathy   Lungs - Lungs clear;  no wheezing or rales.  Heart - regular rate and rhythm. No murmurs, rub.    Labs:  Rapid Strep test is negative; await throat culture results.

## 2023-04-17 NOTE — PATIENT INSTRUCTIONS
Strep test is negative.  Can use Tylenol and/or ibuprofen as needed for pain.  Maximum dose of Tylenol is 4000mg in a 24 hour period of time.  Take ibuprofen with food to avoid stomach upset.  You can also try warm salt water gargles, hot/warm water or tea with honey and/or lemon and/or Cepacol lozenges or spray for your sore throat.

## 2023-04-19 NOTE — TELEPHONE ENCOUNTER
FUTURE VISIT INFORMATION      FUTURE VISIT INFORMATION:    Date: 6/6/23    Time: 8:20AM    Location: [facility]  REFERRAL INFORMATION:    Referring provider:  Kj Deleon APRN CNP    Referring providers clinic:  [facility]     Reason for visit/diagnosis  sore throat for about a month. confirmed [facility]    RECORDS REQUESTED FROM:       Clinic name Comments Records Status Imaging Status   [facility]  4/17/23- NOTE WITH Kj Deleon APRN CNP Epic

## 2023-05-01 ENCOUNTER — VIRTUAL VISIT (OUTPATIENT)
Dept: FAMILY MEDICINE | Facility: CLINIC | Age: 33
End: 2023-05-01
Payer: COMMERCIAL

## 2023-05-01 DIAGNOSIS — H00.019 HORDEOLUM, UNSPECIFIED HORDEOLUM TYPE, UNSPECIFIED LATERALITY: Primary | ICD-10-CM

## 2023-05-01 PROCEDURE — 99213 OFFICE O/P EST LOW 20 MIN: CPT | Mod: VID | Performed by: FAMILY MEDICINE

## 2023-05-01 RX ORDER — ERYTHROMYCIN 5 MG/G
0.5 OINTMENT OPHTHALMIC 2 TIMES DAILY
Qty: 3.5 G | Refills: 0 | Status: SHIPPED | OUTPATIENT
Start: 2023-05-01

## 2023-05-01 NOTE — PROGRESS NOTES
Duc is a 33 year old who is being evaluated via a billable video visit.      How would you like to obtain your AVS? MyChart  If the video visit is dropped, the invitation should be resent by: Text to cell phone: [phone]  Will anyone else be joining your video visit? No        5:00 PM  5:22 PM    Assessment & Plan     Hordeolum, unspecified hordeolum type, unspecified laterality  Has had persisting styes and alternating between the eyes; discussed treatment with a topical antibiotic if persist might require parenteral antibiotic or referral to ophthalmology.  Will update in 1 week  - erythromycin (ROMYCIN) 5 MG/GM ophthalmic ointment; Place 0.5 inches into both eyes 2 times daily (for 7-10 days)      Jonnie Davis MD  [facility]   Duc is a 33 year old, presenting for the following health issues:  Stye / Hordeolum Evaluation    Rt eye alternate x 1 month been recurrent every 2   Sometimes paiful        View : No data to display.              History of Present Illness       Reason for visit:  Continued stye that won t go away.    He eats 4 or more servings of fruits and vegetables daily.He consumes 1 sweetened beverage(s) daily.He exercises with enough effort to increase his heart rate 20 to 29 minutes per day.  He exercises with enough effort to increase his heart rate 4 days per week.   He is taking medications regularly.         Review of Systems         Objective           Vitals:  No vitals were obtained today due to virtual visit.    Physical Exam       Video-Visit Details    Type of service:  Video Visit     Originating Location (pt. Location): Home  Distant Location (provider location):  On-site  Platform used for Video Visit: [employer]

## 2023-06-05 ENCOUNTER — PATIENT OUTREACH (OUTPATIENT)
Dept: CARE COORDINATION | Facility: CLINIC | Age: 33
End: 2023-06-05
Payer: COMMERCIAL

## 2023-06-06 ENCOUNTER — OFFICE VISIT (OUTPATIENT)
Dept: OTOLARYNGOLOGY | Facility: CLINIC | Age: 33
End: 2023-06-06
Payer: COMMERCIAL

## 2023-06-06 ENCOUNTER — PRE VISIT (OUTPATIENT)
Dept: OTOLARYNGOLOGY | Facility: CLINIC | Age: 33
End: 2023-06-06

## 2023-06-06 VITALS
HEART RATE: 87 BPM | BODY MASS INDEX: 28.62 KG/M2 | SYSTOLIC BLOOD PRESSURE: 123 MMHG | HEIGHT: 74 IN | DIASTOLIC BLOOD PRESSURE: 66 MMHG | WEIGHT: 223 LBS

## 2023-06-06 DIAGNOSIS — R07.0 THROAT PAIN: ICD-10-CM

## 2023-06-06 DIAGNOSIS — K21.9 LARYNGOPHARYNGEAL REFLUX: Primary | ICD-10-CM

## 2023-06-06 PROCEDURE — 31575 DIAGNOSTIC LARYNGOSCOPY: CPT | Performed by: REGISTERED NURSE

## 2023-06-06 PROCEDURE — 99203 OFFICE O/P NEW LOW 30 MIN: CPT | Mod: 25 | Performed by: REGISTERED NURSE

## 2023-06-06 ASSESSMENT — PAIN SCALES - GENERAL: PAINLEVEL: NO PAIN (0)

## 2023-06-06 NOTE — NURSING NOTE
"Chief Complaint   Patient presents with     Pharyngitis     Blood pressure 123/66, pulse 87, height 1.88 m (6' 2\"), weight 101.2 kg (223 lb).    Gaby Pagan LPN    "

## 2023-06-06 NOTE — PATIENT INSTRUCTIONS
- You were seen in the ENT Clinic today by Tiana Villa NP.   - Can use salt and soda gargles. Mix 1/4 teaspoon of baking soda and 1/8 teaspoon of salt in 1 cup of warm water. Stir it up. Then gargle and spit it out. Do this 2-3 times daily as needed.   - Please send a [employer] message or call the ENT clinic at [phone] with any questions or concerns.    Laryngopharyngeal Reflux (LPR)    Characteristics of LPR  Laryngopharyngeal reflux (LPR) is also known as extraesophageal reflux disease. It results from chronic acid and pepsin exposure to the larynx. Patients are usually unaware of LPR and, unlike Gastroesophageal Reflux Disease (GERD) patients, do not usually complain of heartburn (only 35% do complain). GERD occurs when stomach acid and enzymes backflow into the esophagus, causing heartburn and damage to the esophageal lining. LPR occurs when stomach acid and/or food enzymes backflow all the way back into the lower part of the throat (laryngopharynx).     Many people with LPR do not have symptoms of heartburn. Comped to the esophagus, the voice box and the back of the throat are significantly more sensitive to the effects of acid/pepsin on the surrounding tissues. Acid that passes quickly through the esophagus (food pipe) does not have a chance to irritate the area for too long. However, acid that pools in the throat around the voice box causes prolonged irritation, resulting in the symptoms of LPR.    Common Symptoms of LPR  - Hoarseness  - Chronic throat irritation  - Chronic cough  - Cough that wakes you from your sleep  - Thick or too much mucus  - Chronic throat-clearing  - Voice problems    Treatment of LPR    Diet Changes:    Different foods affect LPR by different mechanisms. These specific foods should be avoided or reduced drastically, or they will interfere with the healing process:    Caffeine, alcohol, chocolates and peppermints weaken the lower esophageal sphincter, which normally holds in the  stomach contents.    Citrus fruits, tomatoes (and other acidic foods), spicy deli meats and hot spices directly irritate the throat lining. This means that even if the medicines are working well, eating these foods will cause direct irration and inflammation of the throat lining.    Carbonated beverages (such as sodas and beer) bring acidic contents into the throat.    Behavior Changes:    Do not increase the pressure within the abdomen for at least 2 hours after eating (no bending over, exercising, or singing) as it will force contents back into the throat.    Do not over-distend the stomach (eat smaller meals throughout the day, instead of 3 larger meals).    Do not lie down within 3 hours after eating a meal. Do not eat a snack or drink before going to sleep.    Medicines    Proton pump inhibitors (PPIs) are the most effective medicines for the treatment of LPR.    When treatment is indicated, we typically start with a low-dose PPI (eg, omeprazole 20 mg) once daily, one half-hour before a meal, preferably in the morning. If symptoms do not start to improve within six to eight weeks of use, contact the ENT clinic to determine if the dose should be increased.     Please keep in mind the lag between the time you take the medicine and the time you start feeling symptom relief. People who stop taking the medicines typically feel fine for 1-3 weeks and then notice gradual return of symptoms. It takes a few weeks to get back to where they were before. Some people successfully come off of the medicines but need to follow a strict diet.

## 2023-06-06 NOTE — PROGRESS NOTES
[facility]   Head and Neck Surgery  June 6, 2023    HPI: Alexis Ibrahim is a 33 year old male who presents today for evaluation of a persistent left sore throat. Symptoms presented about 3 months ago. Reports a left sided sore throat with ear pain. Symptoms are present about 3-4 days per week. Ibuprofen/tylenol will sometimes help but had not found any intervention that consistently improves pain.     Patient denies any dysphagia, odynophagia, mouth pain, ear pain, bleeding from the mouth, hemoptysis, voice changes or lumps/bumps of the neck. History of allergies that are year-round. Takes Allegra daily. Denies GERD symptoms. Does report more stress lately since starting new job about a year ago.    Past Medical History:  Past Medical History:   Diagnosis Date     CARDIOVASCULAR SCREENING; LDL GOAL LESS THAN 160 05/02/2011     Emesis     NOCTURNAL     Uncomplicated asthma 2015    Moderate asthma. Rescue inhaler.     Past Surgical History:  Past Surgical History:   Procedure Laterality Date     none       Medications:  Current Outpatient Medications   Medication Sig Dispense Refill     albuterol (PROAIR HFA/PROVENTIL HFA/VENTOLIN HFA) 108 (90 Base) MCG/ACT inhaler Inhale 2 puffs into the lungs every 6 hours as needed for shortness of breath / dyspnea . No further refills until follow-up appointment 18 g 5     diltiazem 2% in PLO gel To anal opening three times daily.  Use a pea-sized amount.  Store at room temperature. 60 g 1     doxycycline hyclate (VIBRAMYCIN) 100 MG capsule Take 1 capsule (100 mg) by mouth 2 times daily (Patient not taking: Reported on 2/27/2023) 30 capsule 0     EnovaRX-Lidocaine HCl 5 % cream Apply topically 4 times daily as needed Apply thin layer to perianal skin up to 4 times a day for pain. 120 g 1     erythromycin (ROMYCIN) 5 MG/GM ophthalmic ointment Place 0.5 inches into both eyes 2 times daily (for 7-10 days) 3.5 g 0     fexofenadine (ALLEGRA) 180 MG tablet Take  by mouth as needed       hydrOXYzine (VISTARIL) 25 MG capsule Take 1-2 capsules (25-50 mg) by mouth 3 times daily as needed for anxiety 30 capsule 1     methylcellulose (CITRUCEL) powder Take 1 tablespoon 2 times per day. 850 g 3     neomycin-polymyxin-dexamethasone (MAXITROL) 3.5-83115-5.1 ophthalmic ointment Apply to left lower eyelid three times a day and into left eye at bedtime. (Patient not taking: Reported on 7/1/2022) 3.5 g 1     polyethylene glycol (MIRALAX) 17 GM/Dose powder Take 17 g (1 capful) by mouth daily 500 g 1     sildenafil (VIAGRA) 100 MG tablet Take 0.5-1 tablets ( mg) by mouth daily as needed (ED) 12 tablet 3     Allergies:  Allergies   Allergen Reactions     Cat Hair Extract Shortness Of Breath     Pcn [Penicillins] Nausea and Vomiting      Social History:  Social History     Tobacco Use     Smoking status: Never     Smokeless tobacco: Never   Substance Use Topics     Alcohol use: Yes     Comment: social      Drug use: No     ROS: 10 point ROS neg other than the symptoms noted above in the HPI.    Physical Exam:    There were no vitals taken for this visit.     Constitutional:  The patient was unaccompanied, well-groomed, and in no acute distress.     Neurologic: Alert and oriented x 3.  CN's III-XII within normal limits.  Voice normal.   Psychiatric: The patient's affect was calm, cooperative, and appropriate.    Communication:  Normal; communicates verbally, normal voice quality.   Respiratory: Breathing comfortably without stridor or exertion of accessory muscles.   Head/Face:  Normocephalic and atraumatic.  No lesions or scars.   Salivary glands -  Normal size, no tenderness, swelling, or palpable masses   Ears: Pinnae and tragus non-tender.  EAC's and TM's were clear.    Oral Cavity: Normal tongue, floor of mouth, buccal mucosa, and palate.  No lesions or masses on inspection.   Oropharynx: Normal mucosa, palate symmetric with normal elevation. No abnormal lymph tissue in the  oropharynx. 1+ tonsils bilaterally.   Neck: Supple with normal laryngeal and tracheal landmarks.  The parotid beds were without masses.  No palpable thyroid.  Normal range of motion.   Lymphatic: There is no palpable lymphadenopathy in the neck.     Flexible fiberoptic laryngoscopy: Scope exam was indicated due to chronic sore throat. Verbal consent was obtained. The nasal cavity was prepped with an aerosolized solution of topical anesthetic and vasoconstrictive agent. The scope was passed through the anterior nasal cavity and advanced. Inspection of the nasopharynx revealed no gross abnormality. Generalized erythema of the hypopharynx. The base of tongue and vallecula are normal. The epiglottis, AE folds, false cords, true cords, arytenoids are normal. Inspection of the larynx revealed bilaterally mobile vocal cords. Pyriform sinuses are symmetric. The airway is patent. Procedure tolerated well with no immediate complications noted.    Assessment/Plan:  1. Throat pain  Patient with 3 month history of left sided sore throat. Physical and scope exam are normal. There are no concerning masses or ulcerations. Patient does have generalized edema and excess phlegm suspicious for laryngopharyngeal reflux. Recommend daily PPI for 4-6 weeks. Will also provide patient with print out regarding LPR management. Also recommend salt and soda gargles.     Patient will follow up via MyChart regarding symptoms and improvement.     Tiana Villa DNP, APRN, CNP  Otolaryngology  Head & Neck Surgery  [phone]    30 minutes spent on the date of the encounter doing chart review, history and exam, documentation and further activities per the note excluding time performing scope exam.

## 2023-06-06 NOTE — LETTER
6/6/2023       RE: Alexis Ibrahim  [facility] [facility] [address]     Dear Colleague,    Thank you for referring your patient, Alexis Ibrahim, to the [facility] [place] [facility]. Please see a copy of my visit note below.    [facility]   Head and Neck Surgery  June 6, 2023    HPI: Alexis Ibrahim is a 33 year old male who presents today for evaluation of a persistent left sore throat. Symptoms presented about 3 months ago. Reports a left sided sore throat with ear pain. Symptoms are present about 3-4 days per week. Ibuprofen/tylenol will sometimes help but had not found any intervention that consistently improves pain.     Patient denies any dysphagia, odynophagia, mouth pain, ear pain, bleeding from the mouth, hemoptysis, voice changes or lumps/bumps of the neck. History of allergies that are year-round. Takes Allegra daily. Denies GERD symptoms. Does report more stress lately since starting new job about a year ago.    Past Medical History:  Past Medical History:   Diagnosis Date    CARDIOVASCULAR SCREENING; LDL GOAL LESS THAN 160 05/02/2011    Emesis     NOCTURNAL    Uncomplicated asthma 2015    Moderate asthma. Rescue inhaler.     Past Surgical History:  Past Surgical History:   Procedure Laterality Date    none       Medications:  Current Outpatient Medications   Medication Sig Dispense Refill    albuterol (PROAIR HFA/PROVENTIL HFA/VENTOLIN HFA) 108 (90 Base) MCG/ACT inhaler Inhale 2 puffs into the lungs every 6 hours as needed for shortness of breath / dyspnea . No further refills until follow-up appointment 18 g 5    diltiazem 2% in PLO gel To anal opening three times daily.  Use a pea-sized amount.  Store at room temperature. 60 g 1    doxycycline hyclate (VIBRAMYCIN) 100 MG capsule Take 1 capsule (100 mg) by mouth 2 times daily (Patient not taking: Reported on 2/27/2023) 30 capsule  0    EnovaRX-Lidocaine HCl 5 % cream Apply topically 4 times daily as needed Apply thin layer to perianal skin up to 4 times a day for pain. 120 g 1    erythromycin (ROMYCIN) 5 MG/GM ophthalmic ointment Place 0.5 inches into both eyes 2 times daily (for 7-10 days) 3.5 g 0    fexofenadine (ALLEGRA) 180 MG tablet Take by mouth as needed      hydrOXYzine (VISTARIL) 25 MG capsule Take 1-2 capsules (25-50 mg) by mouth 3 times daily as needed for anxiety 30 capsule 1    methylcellulose (CITRUCEL) powder Take 1 tablespoon 2 times per day. 850 g 3    neomycin-polymyxin-dexamethasone (MAXITROL) 3.5-23733-3.1 ophthalmic ointment Apply to left lower eyelid three times a day and into left eye at bedtime. (Patient not taking: Reported on 7/1/2022) 3.5 g 1    polyethylene glycol (MIRALAX) 17 GM/Dose powder Take 17 g (1 capful) by mouth daily 500 g 1    sildenafil (VIAGRA) 100 MG tablet Take 0.5-1 tablets ( mg) by mouth daily as needed (ED) 12 tablet 3     Allergies:  Allergies   Allergen Reactions    Cat Hair Extract Shortness Of Breath    Pcn [Penicillins] Nausea and Vomiting      Social History:  Social History     Tobacco Use    Smoking status: Never    Smokeless tobacco: Never   Substance Use Topics    Alcohol use: Yes     Comment: social     Drug use: No     ROS: 10 point ROS neg other than the symptoms noted above in the HPI.    Physical Exam:    There were no vitals taken for this visit.     Constitutional:  The patient was unaccompanied, well-groomed, and in no acute distress.     Neurologic: Alert and oriented x 3.  CN's III-XII within normal limits.  Voice normal.   Psychiatric: The patient's affect was calm, cooperative, and appropriate.    Communication:  Normal; communicates verbally, normal voice quality.   Respiratory: Breathing comfortably without stridor or exertion of accessory muscles.   Head/Face:  Normocephalic and atraumatic.  No lesions or scars.   Salivary glands -  Normal size, no tenderness,  swelling, or palpable masses   Ears: Pinnae and tragus non-tender.  EAC's and TM's were clear.    Oral Cavity: Normal tongue, floor of mouth, buccal mucosa, and palate.  No lesions or masses on inspection.   Oropharynx: Normal mucosa, palate symmetric with normal elevation. No abnormal lymph tissue in the oropharynx. 1+ tonsils bilaterally.   Neck: Supple with normal laryngeal and tracheal landmarks.  The parotid beds were without masses.  No palpable thyroid.  Normal range of motion.   Lymphatic: There is no palpable lymphadenopathy in the neck.     Flexible fiberoptic laryngoscopy: Scope exam was indicated due to chronic sore throat. Verbal consent was obtained. The nasal cavity was prepped with an aerosolized solution of topical anesthetic and vasoconstrictive agent. The scope was passed through the anterior nasal cavity and advanced. Inspection of the nasopharynx revealed no gross abnormality. Generalized erythema of the hypopharynx. The base of tongue and vallecula are normal. The epiglottis, AE folds, false cords, true cords, arytenoids are normal. Inspection of the larynx revealed bilaterally mobile vocal cords. Pyriform sinuses are symmetric. The airway is patent. Procedure tolerated well with no immediate complications noted.    Assessment/Plan:  1. Throat pain  Patient with 3 month history of left sided sore throat. Physical and scope exam are normal. There are no concerning masses or ulcerations. Patient does have generalized edema and excess phlegm suspicious for laryngopharyngeal reflux. Recommend daily PPI for 4-6 weeks. Will also provide patient with print out regarding LPR management. Also recommend salt and soda gargles.     Patient will follow up via MyChart regarding symptoms and improvement.     Tiana Villa DNP, APRN, CNP  Otolaryngology  Head & Neck Surgery  [phone]    30 minutes spent on the date of the encounter doing chart review, history and exam, documentation and further activities  per the note excluding time performing scope exam.        Again, thank you for allowing me to participate in the care of your patient.      Sincerely,    Dona Villa NP

## 2023-06-19 ENCOUNTER — PATIENT OUTREACH (OUTPATIENT)
Dept: CARE COORDINATION | Facility: CLINIC | Age: 33
End: 2023-06-19
Payer: COMMERCIAL

## 2023-07-07 ENCOUNTER — TELEPHONE (OUTPATIENT)
Dept: FAMILY MEDICINE | Facility: CLINIC | Age: 33
End: 2023-07-07
Payer: COMMERCIAL

## 2023-07-07 NOTE — TELEPHONE ENCOUNTER
Patient Quality Outreach    Patient is due for the following:   Asthma  -  Asthma follow-up visit  Physical Preventive Adult Physical    Next Steps:   Schedule a Annual Wellness Visit    Type of outreach:    Sent [employer] message.      Questions for provider review:    None           STEFANIA CHAVEZ, CMA

## 2023-08-14 DIAGNOSIS — J45.20 MILD INTERMITTENT ASTHMA, UNSPECIFIED WHETHER COMPLICATED: ICD-10-CM

## 2023-08-14 RX ORDER — ALBUTEROL SULFATE 90 UG/1
2 AEROSOL, METERED RESPIRATORY (INHALATION) EVERY 6 HOURS PRN
Qty: 8.5 G | Refills: 0 | Status: SHIPPED | OUTPATIENT
Start: 2023-08-14 | End: 2023-09-22

## 2023-09-21 ASSESSMENT — ASTHMA QUESTIONNAIRES: ACT_TOTALSCORE: 20

## 2023-09-22 ENCOUNTER — VIRTUAL VISIT (OUTPATIENT)
Dept: FAMILY MEDICINE | Facility: CLINIC | Age: 33
End: 2023-09-22
Payer: COMMERCIAL

## 2023-09-22 DIAGNOSIS — F41.9 ANXIOUSNESS: ICD-10-CM

## 2023-09-22 DIAGNOSIS — J45.20 MILD INTERMITTENT ASTHMA, UNSPECIFIED WHETHER COMPLICATED: ICD-10-CM

## 2023-09-22 PROCEDURE — 99214 OFFICE O/P EST MOD 30 MIN: CPT | Mod: VID | Performed by: PHYSICIAN ASSISTANT

## 2023-09-22 RX ORDER — HYDROXYZINE PAMOATE 25 MG/1
25-50 CAPSULE ORAL 3 TIMES DAILY PRN
Qty: 30 CAPSULE | Refills: 1 | Status: SHIPPED | OUTPATIENT
Start: 2023-09-22

## 2023-09-22 RX ORDER — ALBUTEROL SULFATE 90 UG/1
2 AEROSOL, METERED RESPIRATORY (INHALATION) EVERY 6 HOURS PRN
Qty: 8.5 G | Refills: 0 | Status: SHIPPED | OUTPATIENT
Start: 2023-09-22 | End: 2023-09-22

## 2023-09-22 RX ORDER — ALBUTEROL SULFATE 90 UG/1
2 AEROSOL, METERED RESPIRATORY (INHALATION) EVERY 6 HOURS PRN
Qty: 8.5 G | Refills: 3 | Status: SHIPPED | OUTPATIENT
Start: 2023-09-22 | End: 2024-05-09

## 2023-09-22 NOTE — PROGRESS NOTES
"Duc is a 33 year old who is being evaluated via a billable video visit.      How would you like to obtain your AVS? MyChart  If the video visit is dropped, the invitation should be resent by: Send to e-mail at: [email or web address]  Will anyone else be joining your video visit? No          Assessment & Plan     Mild intermittent asthma, unspecified whether complicated  Refilled. Doing well. Suggest he let us know if worsening symptoms causing more albuterol inhaler use.   - albuterol (PROAIR HFA/PROVENTIL HFA/VENTOLIN HFA) 108 (90 Base) MCG/ACT inhaler; Inhale 2 puffs into the lungs every 6 hours as needed for shortness of breath . No further refills until follow-up appointment    Anxiousness  Refilled. Doing well with this medication on a PRN basis.   - hydrOXYzine (VISTARIL) 25 MG capsule; Take 1-2 capsules (25-50 mg) by mouth 3 times daily as needed for anxiety             BMI:   Estimated body mass index is 28.63 kg/m  as calculated from the following:    Height as of 6/6/23: 1.88 m (6' 2\").    Weight as of 6/6/23: 101.2 kg (223 lb).   Weight management plan: Discussed healthy diet and exercise guidelines        Gladys Shaikh PA-C  [facility]    Subjective   Duc is a 33 year old, presenting for the following health issues:  Recheck Medication (refills)      9/22/2023     1:21 PM   Additional Questions   Roomed by carolina       History of Present Illness       Reason for visit:  Discuss prescription refills for asthma.    He eats 2-3 servings of fruits and vegetables daily.He consumes 0 sweetened beverage(s) daily.He exercises with enough effort to increase his heart rate 30 to 60 minutes per day.  He exercises with enough effort to increase his heart rate 4 days per week.   He is taking medications regularly.       Using albuterol every 2-3 days. Usually seasonal. Used to be much worse - but now that he is better about dusting. Has tried a preventive inhaler in the past which didn't seem to " help.       Asthma Follow-Up    Was ACT completed today?  Yes        9/21/2023     2:41 PM   ACT Total Scores   ACT TOTAL SCORE (Goal Greater than or Equal to 20) 20   In the past 12 months, how many times did you visit the emergency room for your asthma without being admitted to the hospital? 0   In the past 12 months, how many times were you hospitalized overnight because of your asthma? 0          Using hydroxyzine - recently started a new job. 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 Feeling like it was too much - medication helps a lot. Only taking 1-2 times a month.     How many days per week do you miss taking your asthma controller medication?  0  Please describe any recent triggers for your asthma: None  Have you had any Emergency Room Visits, Urgent Care Visits, or Hospital Admissions since your last office visit?  No        Review of Systems         Objective           Vitals:  No vitals were obtained today due to virtual visit.    Physical Exam   GENERAL: Healthy, alert and no distress  EYES: Eyes grossly normal to inspection.  No discharge or erythema, or obvious scleral/conjunctival abnormalities.  RESP: No audible wheeze, cough, or visible cyanosis.  No visible retractions or increased work of breathing.    SKIN: Visible skin clear. No significant rash, abnormal pigmentation or lesions.  NEURO: Cranial nerves grossly intact.  Mentation and speech appropriate for age.  PSYCH: Mentation appears normal, affect normal/bright, judgement and insight intact, normal speech and appearance well-groomed.                Video-Visit Details    Type of service:  Video Visit     Originating Location (pt. Location): Home    Distant Location (provider location):  On-site  Platform used for Video Visit: [employer]

## 2023-09-23 ENCOUNTER — HEALTH MAINTENANCE LETTER (OUTPATIENT)
Age: 33
End: 2023-09-23

## 2023-10-24 ASSESSMENT — ENCOUNTER SYMPTOMS
SHORTNESS OF BREATH: 0
DYSURIA: 0
FREQUENCY: 0
CONSTIPATION: 0
FEVER: 0
CHILLS: 0
WEAKNESS: 0
COUGH: 0
HEMATURIA: 0
MYALGIAS: 1
NERVOUS/ANXIOUS: 1
ABDOMINAL PAIN: 0
NAUSEA: 0
HEADACHES: 0
EYE PAIN: 0
PALPITATIONS: 0
SORE THROAT: 0
JOINT SWELLING: 0
DIZZINESS: 0
HEARTBURN: 1
PARESTHESIAS: 0
HEMATOCHEZIA: 0
DIARRHEA: 0
ARTHRALGIAS: 1

## 2023-10-31 ENCOUNTER — OFFICE VISIT (OUTPATIENT)
Dept: FAMILY MEDICINE | Facility: CLINIC | Age: 33
End: 2023-10-31
Payer: COMMERCIAL

## 2023-10-31 VITALS
DIASTOLIC BLOOD PRESSURE: 81 MMHG | HEIGHT: 74 IN | SYSTOLIC BLOOD PRESSURE: 123 MMHG | RESPIRATION RATE: 16 BRPM | WEIGHT: 236.2 LBS | HEART RATE: 80 BPM | BODY MASS INDEX: 30.31 KG/M2 | OXYGEN SATURATION: 95 %

## 2023-10-31 DIAGNOSIS — D22.9 NUMEROUS SKIN MOLES: ICD-10-CM

## 2023-10-31 DIAGNOSIS — Z00.00 ROUTINE HISTORY AND PHYSICAL EXAMINATION OF ADULT: Primary | ICD-10-CM

## 2023-10-31 DIAGNOSIS — M25.512 ACUTE PAIN OF LEFT SHOULDER: ICD-10-CM

## 2023-10-31 PROCEDURE — 80061 LIPID PANEL: CPT | Performed by: FAMILY MEDICINE

## 2023-10-31 PROCEDURE — 36415 COLL VENOUS BLD VENIPUNCTURE: CPT | Performed by: FAMILY MEDICINE

## 2023-10-31 PROCEDURE — 99395 PREV VISIT EST AGE 18-39: CPT | Performed by: FAMILY MEDICINE

## 2023-10-31 PROCEDURE — 80053 COMPREHEN METABOLIC PANEL: CPT | Performed by: FAMILY MEDICINE

## 2023-10-31 ASSESSMENT — ENCOUNTER SYMPTOMS
HEARTBURN: 1
DIZZINESS: 0
CONSTIPATION: 0
CHILLS: 0
PARESTHESIAS: 0
FREQUENCY: 0
WEAKNESS: 0
EYE PAIN: 0
HEMATOCHEZIA: 0
NAUSEA: 0
JOINT SWELLING: 0
HEADACHES: 0
MYALGIAS: 1
FEVER: 0
SHORTNESS OF BREATH: 0
PALPITATIONS: 0
SORE THROAT: 0
DIARRHEA: 0
DYSURIA: 0
HEMATURIA: 0
COUGH: 0
NERVOUS/ANXIOUS: 1
ARTHRALGIAS: 1
ABDOMINAL PAIN: 0

## 2023-10-31 ASSESSMENT — PAIN SCALES - GENERAL: PAINLEVEL: SEVERE PAIN (6)

## 2023-10-31 NOTE — PROGRESS NOTES
"SUBJECTIVE:   CC: Duc is an 33 year old who presents for preventative health visit.       10/31/2023     3:14 PM   Additional Questions   Roomed by Olivia HUTTON   Accompanied by self     Healthy Habits:     Getting at least 3 servings of Calcium per day:  Yes    Bi-annual eye exam:  NO    Dental care twice a year:  Yes    Sleep apnea or symptoms of sleep apnea:  None    Diet:  Regular (no restrictions)    Frequency of exercise:  2-3 days/week    Duration of exercise:  15-30 minutes    Taking medications regularly:  Yes    Medication side effects:  Not applicable    Additional concerns today:  Yes    PROBLEMS TO ADD ON...  Concerns:  Moles on head; more prominent as balds, one at bottom of foot  Lt shoulder pain: no possible injury; having having hard time lifting arm (x a couple of weeks). Started all of a sudden.     Shots:   Scheduled through work    Weight:     Wt Readings from Last 4 Encounters:   10/31/23 107.1 kg (236 lb 3.2 oz)   06/06/23 101.2 kg (223 lb)   04/17/23 102 kg (224 lb 14.4 oz)   01/24/23 100.2 kg (220 lb 12.8 oz)      Social History     Tobacco Use    Smoking status: Never    Smokeless tobacco: Never   Substance Use Topics    Alcohol use: Yes     Comment: social            10/24/2023     9:24 AM   Alcohol Use   Prescreen: >3 drinks/day or >7 drinks/week? No          No data to display                Last PSA: No results found for: \"PSA\"    Reviewed orders with patient. Reviewed health maintenance and updated orders accordingly - Yes  Patient Active Problem List   Diagnosis    CARDIOVASCULAR SCREENING; LDL GOAL LESS THAN 160    Intermittent asthma, uncomplicated     Past Surgical History:   Procedure Laterality Date    none         Social History     Tobacco Use    Smoking status: Never    Smokeless tobacco: Never   Substance Use Topics    Alcohol use: Yes     Comment: social      Family History   Problem Relation Age of Onset    Unknown/Adopted Mother     Unknown/Adopted Father     Glaucoma No " family hx of     Macular Degeneration No family hx of            Reviewed and updated as needed this visit by clinical staff   Tobacco  Allergies  Meds              Reviewed and updated as needed this visit by Provider                     Review of Systems   Constitutional:  Negative for chills and fever.   HENT:  Negative for congestion, ear pain, hearing loss and sore throat.    Eyes:  Negative for pain and visual disturbance.   Respiratory:  Negative for cough and shortness of breath.    Cardiovascular:  Positive for chest pain. Negative for palpitations and peripheral edema.   Gastrointestinal:  Positive for heartburn. Negative for abdominal pain, constipation, diarrhea, hematochezia and nausea.   Genitourinary:  Negative for dysuria, frequency, genital sores, hematuria, impotence, penile discharge and urgency.   Musculoskeletal:  Positive for arthralgias and myalgias. Negative for joint swelling.   Skin:  Negative for rash.   Neurological:  Negative for dizziness, weakness, headaches and paresthesias.   Psychiatric/Behavioral:  Negative for mood changes. The patient is nervous/anxious.      OBJECTIVE:   There were no vitals taken for this visit.    Physical Exam  GENERAL: healthy, alert and no distress  EYES: Eyes grossly normal to inspection, PERRL and conjunctivae and sclerae normal  HENT: ear canals and TM's normal, nose and mouth without ulcers or lesions  NECK: no adenopathy and thyroid normal to palpation  RESP: lungs clear to auscultation - no rales, rhonchi or wheezes  CV: regular rate and rhythm, normal S1 S2, no S3 or S4, no murmur, click or rub, no peripheral edema   ABDOMEN: soft, nontender, no hepatosplenomegaly, no masses and bowel sounds normal  MS: no gross musculoskeletal defects noted. Lt shoulder: reduced abduction  SKIN: no suspicious lesions or rashes  NEURO: Normal strength and tone, mentation intact and speech normal  PSYCH: mentation appears normal, affect normal/bright    Diagnostic  Test Results:  Labs reviewed in Epic    ASSESSMENT/PLAN:   Duc was seen today for physical.    Diagnoses and all orders for this visit:    Routine history and physical examination of adult  -     Lipid Profile (Chol, Trig, HDL, LDL calc); Future  -     Comprehensive metabolic panel (BMP + Alb, Alk Phos, ALT, AST, Total. Bili, TP); Future  -     Lipid Profile (Chol, Trig, HDL, LDL calc)  -     Comprehensive metabolic panel (BMP + Alb, Alk Phos, ALT, AST, Total. Bili, TP)    Skin moles       -  Fleshy mole of scalp, mole?wart bottom Lt foot.    Acute pain of left shoulder       -   possible frozen shoulder    Patient has been advised of split billing requirements and indicates understanding: Yes      COUNSELING:   Reviewed preventive health counseling, as reflected in patient instructions       Regular exercise       Healthy diet/nutrition       Immunizations  Declined: Influenza due to Other Will do at work          He reports that he has never smoked. He has never used smokeless tobacco.      {Counseling Resources  [place] Preventive Services Task Force  Cholesterol Screening  Health diet/nutrition  Pooled Cohorts Equation Calculator  USDA's MyPlate  ASA Prophylaxis  Lung CA Screening  Osteoporosis prevention/bone health   {Prostate Cancer Screening  Consider for men 55-69 per guidance from USPSTF    Jonnie Davis MD  [facility]

## 2023-11-01 LAB
ALBUMIN SERPL BCG-MCNC: 4.5 G/DL (ref 3.5–5.2)
ALP SERPL-CCNC: 117 U/L (ref 40–129)
ALT SERPL W P-5'-P-CCNC: 38 U/L (ref 0–70)
ANION GAP SERPL CALCULATED.3IONS-SCNC: 10 MMOL/L (ref 7–15)
AST SERPL W P-5'-P-CCNC: 23 U/L (ref 0–45)
BILIRUB SERPL-MCNC: 0.4 MG/DL
BUN SERPL-MCNC: 16.3 MG/DL (ref 6–20)
CALCIUM SERPL-MCNC: 9.2 MG/DL (ref 8.6–10)
CHLORIDE SERPL-SCNC: 103 MMOL/L (ref 98–107)
CHOLEST SERPL-MCNC: 233 MG/DL
CREAT SERPL-MCNC: 0.83 MG/DL (ref 0.67–1.17)
DEPRECATED HCO3 PLAS-SCNC: 24 MMOL/L (ref 22–29)
EGFRCR SERPLBLD CKD-EPI 2021: >90 ML/MIN/1.73M2
GLUCOSE SERPL-MCNC: 90 MG/DL (ref 70–99)
HDLC SERPL-MCNC: 40 MG/DL
LDLC SERPL CALC-MCNC: 144 MG/DL
NONHDLC SERPL-MCNC: 193 MG/DL
POTASSIUM SERPL-SCNC: 4.2 MMOL/L (ref 3.4–5.3)
PROT SERPL-MCNC: 7.1 G/DL (ref 6.4–8.3)
SODIUM SERPL-SCNC: 137 MMOL/L (ref 135–145)
TRIGL SERPL-MCNC: 244 MG/DL

## 2024-05-09 ENCOUNTER — MYC REFILL (OUTPATIENT)
Dept: FAMILY MEDICINE | Facility: CLINIC | Age: 34
End: 2024-05-09
Payer: COMMERCIAL

## 2024-05-09 DIAGNOSIS — J45.20 MILD INTERMITTENT ASTHMA, UNSPECIFIED WHETHER COMPLICATED: ICD-10-CM

## 2024-05-10 RX ORDER — ALBUTEROL SULFATE 90 UG/1
2 AEROSOL, METERED RESPIRATORY (INHALATION) EVERY 6 HOURS PRN
Qty: 8.5 G | Refills: 2 | Status: SHIPPED | OUTPATIENT
Start: 2024-05-10

## 2024-10-01 ENCOUNTER — PATIENT OUTREACH (OUTPATIENT)
Dept: CARE COORDINATION | Facility: CLINIC | Age: 34
End: 2024-10-01
Payer: COMMERCIAL

## 2024-10-15 ENCOUNTER — PATIENT OUTREACH (OUTPATIENT)
Dept: CARE COORDINATION | Facility: CLINIC | Age: 34
End: 2024-10-15
Payer: COMMERCIAL

## 2024-10-21 ENCOUNTER — TELEPHONE (OUTPATIENT)
Dept: FAMILY MEDICINE | Facility: CLINIC | Age: 34
End: 2024-10-21
Payer: COMMERCIAL

## 2024-10-21 NOTE — TELEPHONE ENCOUNTER
Patient Quality Outreach    Patient is due for the following:   Asthma  -  Asthma follow-up visit  Physical Preventive Adult Physical    Next Steps:   Schedule a Adult Preventative    Type of outreach:    Sent [employer] message.      Questions for provider review:    None           STEFANIA CHAVEZ, CMA

## 2024-10-22 SDOH — HEALTH STABILITY: PHYSICAL HEALTH: ON AVERAGE, HOW MANY MINUTES DO YOU ENGAGE IN EXERCISE AT THIS LEVEL?: 40 MIN

## 2024-10-22 SDOH — HEALTH STABILITY: PHYSICAL HEALTH: ON AVERAGE, HOW MANY DAYS PER WEEK DO YOU ENGAGE IN MODERATE TO STRENUOUS EXERCISE (LIKE A BRISK WALK)?: 4 DAYS

## 2024-10-22 ASSESSMENT — ASTHMA QUESTIONNAIRES
QUESTION_4 LAST FOUR WEEKS HOW OFTEN HAVE YOU USED YOUR RESCUE INHALER OR NEBULIZER MEDICATION (SUCH AS ALBUTEROL): ONCE A WEEK OR LESS
QUESTION_2 LAST FOUR WEEKS HOW OFTEN HAVE YOU HAD SHORTNESS OF BREATH: ONCE OR TWICE A WEEK
QUESTION_1 LAST FOUR WEEKS HOW MUCH OF THE TIME DID YOUR ASTHMA KEEP YOU FROM GETTING AS MUCH DONE AT WORK, SCHOOL OR AT HOME: NONE OF THE TIME
QUESTION_5 LAST FOUR WEEKS HOW WOULD YOU RATE YOUR ASTHMA CONTROL: WELL CONTROLLED
ACT_TOTALSCORE: 22
ACT_TOTALSCORE: 22
QUESTION_3 LAST FOUR WEEKS HOW OFTEN DID YOUR ASTHMA SYMPTOMS (WHEEZING, COUGHING, SHORTNESS OF BREATH, CHEST TIGHTNESS OR PAIN) WAKE YOU UP AT NIGHT OR EARLIER THAN USUAL IN THE MORNING: NOT AT ALL

## 2024-10-22 ASSESSMENT — SOCIAL DETERMINANTS OF HEALTH (SDOH): HOW OFTEN DO YOU GET TOGETHER WITH FRIENDS OR RELATIVES?: ONCE A WEEK

## 2024-10-23 ENCOUNTER — OFFICE VISIT (OUTPATIENT)
Dept: FAMILY MEDICINE | Facility: CLINIC | Age: 34
End: 2024-10-23
Payer: COMMERCIAL

## 2024-10-23 VITALS
SYSTOLIC BLOOD PRESSURE: 118 MMHG | DIASTOLIC BLOOD PRESSURE: 74 MMHG | TEMPERATURE: 97.5 F | WEIGHT: 237 LBS | HEIGHT: 74 IN | OXYGEN SATURATION: 98 % | HEART RATE: 72 BPM | RESPIRATION RATE: 19 BRPM | BODY MASS INDEX: 30.42 KG/M2

## 2024-10-23 DIAGNOSIS — Z00.00 ROUTINE GENERAL MEDICAL EXAMINATION AT A HEALTH CARE FACILITY: Primary | ICD-10-CM

## 2024-10-23 DIAGNOSIS — K60.2 ANAL FISSURE: ICD-10-CM

## 2024-10-23 DIAGNOSIS — J45.20 MILD INTERMITTENT ASTHMA, UNSPECIFIED WHETHER COMPLICATED: ICD-10-CM

## 2024-10-23 DIAGNOSIS — J45.20 MILD INTERMITTENT ASTHMA WITHOUT COMPLICATION: ICD-10-CM

## 2024-10-23 DIAGNOSIS — F41.9 ANXIOUSNESS: ICD-10-CM

## 2024-10-23 LAB
ALBUMIN SERPL BCG-MCNC: 4.5 G/DL (ref 3.5–5.2)
ALP SERPL-CCNC: 141 U/L (ref 40–150)
ALT SERPL W P-5'-P-CCNC: 21 U/L (ref 0–70)
ANION GAP SERPL CALCULATED.3IONS-SCNC: 11 MMOL/L (ref 7–15)
AST SERPL W P-5'-P-CCNC: 15 U/L (ref 0–45)
BASOPHILS # BLD AUTO: 0.1 10E3/UL (ref 0–0.2)
BASOPHILS NFR BLD AUTO: 1 %
BILIRUB SERPL-MCNC: 0.4 MG/DL
BUN SERPL-MCNC: 12 MG/DL (ref 6–20)
CALCIUM SERPL-MCNC: 9.4 MG/DL (ref 8.8–10.4)
CHLORIDE SERPL-SCNC: 105 MMOL/L (ref 98–107)
CHOLEST SERPL-MCNC: 224 MG/DL
CREAT SERPL-MCNC: 0.82 MG/DL (ref 0.67–1.17)
EGFRCR SERPLBLD CKD-EPI 2021: >90 ML/MIN/1.73M2
EOSINOPHIL # BLD AUTO: 0.3 10E3/UL (ref 0–0.7)
EOSINOPHIL NFR BLD AUTO: 5 %
ERYTHROCYTE [DISTWIDTH] IN BLOOD BY AUTOMATED COUNT: 11.6 % (ref 10–15)
FASTING STATUS PATIENT QL REPORTED: YES
FASTING STATUS PATIENT QL REPORTED: YES
GLUCOSE SERPL-MCNC: 93 MG/DL (ref 70–99)
HCO3 SERPL-SCNC: 24 MMOL/L (ref 22–29)
HCT VFR BLD AUTO: 44.7 % (ref 40–53)
HDLC SERPL-MCNC: 42 MG/DL
HGB BLD-MCNC: 15.6 G/DL (ref 13.3–17.7)
IMM GRANULOCYTES # BLD: 0 10E3/UL
IMM GRANULOCYTES NFR BLD: 0 %
LDLC SERPL CALC-MCNC: 164 MG/DL
LYMPHOCYTES # BLD AUTO: 1.9 10E3/UL (ref 0.8–5.3)
LYMPHOCYTES NFR BLD AUTO: 32 %
MCH RBC QN AUTO: 28.7 PG (ref 26.5–33)
MCHC RBC AUTO-ENTMCNC: 34.9 G/DL (ref 31.5–36.5)
MCV RBC AUTO: 82 FL (ref 78–100)
MONOCYTES # BLD AUTO: 0.5 10E3/UL (ref 0–1.3)
MONOCYTES NFR BLD AUTO: 8 %
NEUTROPHILS # BLD AUTO: 3.2 10E3/UL (ref 1.6–8.3)
NEUTROPHILS NFR BLD AUTO: 53 %
NONHDLC SERPL-MCNC: 182 MG/DL
PLATELET # BLD AUTO: 291 10E3/UL (ref 150–450)
POTASSIUM SERPL-SCNC: 4.5 MMOL/L (ref 3.4–5.3)
PROT SERPL-MCNC: 7 G/DL (ref 6.4–8.3)
RBC # BLD AUTO: 5.43 10E6/UL (ref 4.4–5.9)
SODIUM SERPL-SCNC: 140 MMOL/L (ref 135–145)
TRIGL SERPL-MCNC: 88 MG/DL
WBC # BLD AUTO: 5.9 10E3/UL (ref 4–11)

## 2024-10-23 PROCEDURE — 85025 COMPLETE CBC W/AUTO DIFF WBC: CPT | Performed by: FAMILY MEDICINE

## 2024-10-23 PROCEDURE — 99212 OFFICE O/P EST SF 10 MIN: CPT | Mod: 25 | Performed by: FAMILY MEDICINE

## 2024-10-23 PROCEDURE — 90471 IMMUNIZATION ADMIN: CPT | Performed by: FAMILY MEDICINE

## 2024-10-23 PROCEDURE — 90472 IMMUNIZATION ADMIN EACH ADD: CPT | Performed by: FAMILY MEDICINE

## 2024-10-23 PROCEDURE — 99395 PREV VISIT EST AGE 18-39: CPT | Mod: 25 | Performed by: FAMILY MEDICINE

## 2024-10-23 PROCEDURE — 36415 COLL VENOUS BLD VENIPUNCTURE: CPT | Performed by: FAMILY MEDICINE

## 2024-10-23 PROCEDURE — 80053 COMPREHEN METABOLIC PANEL: CPT | Performed by: FAMILY MEDICINE

## 2024-10-23 PROCEDURE — 80061 LIPID PANEL: CPT | Performed by: FAMILY MEDICINE

## 2024-10-23 PROCEDURE — 90677 PCV20 VACCINE IM: CPT | Performed by: FAMILY MEDICINE

## 2024-10-23 PROCEDURE — 90715 TDAP VACCINE 7 YRS/> IM: CPT | Performed by: FAMILY MEDICINE

## 2024-10-23 RX ORDER — HYDROXYZINE PAMOATE 50 MG/1
50 CAPSULE ORAL 3 TIMES DAILY PRN
Qty: 90 CAPSULE | Refills: 1 | Status: SHIPPED | OUTPATIENT
Start: 2024-10-23

## 2024-10-23 RX ORDER — ALBUTEROL SULFATE 90 UG/1
2 INHALANT RESPIRATORY (INHALATION) EVERY 6 HOURS PRN
Qty: 8.5 G | Refills: 5 | Status: SHIPPED | OUTPATIENT
Start: 2024-10-23

## 2024-10-23 ASSESSMENT — PAIN SCALES - GENERAL: PAINLEVEL_OUTOF10: NO PAIN (0)

## 2024-10-23 NOTE — PROGRESS NOTES
"Preventive Care Visit  [facility] MD, Family Medicine  Oct 23, 2024    Duc is a 35 yo with intermittent asthma, allergy to cat hair,  anxiety/panic attacks on albuterol and flovent      Prostate cancer/Colon cancer:   Shot: pcv, tdap, flu, covid  Care gaps: covid#4, flu, cmp, ldl, (last blood work 10/23: elevated chol/triglycerides)    care gaps\" acp, pcv, hiv, hep c,. act/aap, flu, phq2,   Assessment & Plan     Routine general medical examination at a health care facility  - CBC with platelets and differential  - Comprehensive metabolic panel (BMP + Alb, Alk Phos, ALT, AST, Total. Bili, TP)  - Lipid Profile (Chol, Trig, HDL, LDL calc)  - CBC with platelets and differential  - Comprehensive metabolic panel (BMP + Alb, Alk Phos, ALT, AST, Total. Bili, TP)  - Lipid Profile (Chol, Trig, HDL, LDL calc)    Mild intermittent asthma without complication/Mild intermittent asthma, unspecified whether complicated  - albuterol (PROAIR HFA/PROVENTIL HFA/VENTOLIN HFA) 108 (90 Base) MCG/ACT inhaler  Dispense: 8.5 g; Refill: 5    Anal fissure   Will follow up with colorectal  - Adult Colorectal Surgery  Referral    Anxiousness  - hydrOXYzine elayne (VISTARIL) 50 MG capsule  Dispense: 90 capsule; Refill: 1      Patient has been advised of split billing requirements and indicates understanding: Yes    BMI  Estimated body mass index is 30.58 kg/m  as calculated from the following:    Height as of this encounter: 1.875 m (6' 1.82\").    Weight as of this encounter: 107.5 kg (237 lb).   Weight management plan: Discussed healthy diet and exercise guidelines    Counseling  Appropriate preventive services were addressed with this patient via screening, questionnaire, or discussion as appropriate for fall prevention, nutrition, physical activity, Tobacco-use cessation, social engagement, weight loss and cognition.  Checklist reviewing preventive services available has been given to the " patient.  Reviewed patient's diet, addressing concerns and/or questions.       See Patient Instructions    Subjective   Duc is a 34 year old, presenting for the following:  Physical (Discuss lab work, medication refills)  Anal fissure: still nagging      10/23/2024     9:14 AM   Additional Questions   Roomed by margaret Chadwick ma        Shots: flu andCovid at work  PCV, TDAP    HPI      Asthma Follow-Up    Was ACT completed today?  Yes        10/22/2024    10:49 AM   ACT Total Scores   ACT TOTAL SCORE (Goal Greater than or Equal to 20) 22   In the past 12 months, how many times did you visit the emergency room for your asthma without being admitted to the hospital? 0    In the past 12 months, how many times were you hospitalized overnight because of your asthma? 0        Patient-reported      Health Care Directive  Patient does not have a Health Care Directive: Discussed advance care planning with patient; however, patient declined at this time.      10/22/2024   General Health   How would you rate your overall physical health? Good   Feel stress (tense, anxious, or unable to sleep) To some extent      (!) STRESS CONCERN      10/22/2024   Nutrition   Three or more servings of calcium each day? Yes   Diet: Regular (no restrictions)   How many servings of fruit and vegetables per day? (!) 2-3   How many sweetened beverages each day? 0-1            10/22/2024   Exercise   Days per week of moderate/strenous exercise 4 days   Average minutes spent exercising at this level 40 min            10/22/2024   Social Factors   Frequency of gathering with friends or relatives Once a week   Worry food won't last until get money to buy more No   Food not last or not have enough money for food? No   Do you have housing? (Housing is defined as stable permanent housing and does not include staying ouside in a car, in a tent, in an abandoned building, in an overnight shelter, or couch-surfing.) Yes   Are you worried about losing your housing? No    Lack of transportation? No   Unable to get utilities (heat,electricity)? No            10/22/2024   Dental   Dentist two times every year? Yes            10/22/2024   TB Screening   Were you born outside of the [place]? No      Today's PHQ-2 Score:       10/22/2024    10:48 AM   PHQ-2 ( 1999 [employer])   Q1: Little interest or pleasure in doing things 1    Q2: Feeling down, depressed or hopeless 1    PHQ-2 Score 2   Q1: Little interest or pleasure in doing things Several days   Q2: Feeling down, depressed or hopeless Several days   PHQ-2 Score 2       Patient-reported           10/22/2024   Substance Use   Alcohol more than 3/day or more than 7/wk No   Do you use any other substances recreationally? No        Social History     Tobacco Use    Smoking status: Never     Passive exposure: Never    Smokeless tobacco: Never   Vaping Use    Vaping status: Never Used   Substance Use Topics    Alcohol use: Yes     Comment: social     Drug use: No           10/22/2024   One time HIV Screening   Previous HIV test? No          10/22/2024   STI Screening   New sexual partner(s) since last STI/HIV test? No            10/22/2024   Contraception/Family Planning   Questions about contraception or family planning No      Reviewed and updated as needed this visit by Provider                    Patient Active Problem List   Diagnosis    CARDIOVASCULAR SCREENING; LDL GOAL LESS THAN 160    Intermittent asthma, uncomplicated     Past Surgical History:   Procedure Laterality Date    none         Social History     Tobacco Use    Smoking status: Never     Passive exposure: Never    Smokeless tobacco: Never   Substance Use Topics    Alcohol use: Yes     Comment: social      Family History   Problem Relation Age of Onset    Unknown/Adopted Mother     Unknown/Adopted Father     Glaucoma No family hx of     Macular Degeneration No family hx of          Review of Systems  Constitutional, neuro, ENT, endocrine, pulmonary, cardiac, gastrointestinal,  "genitourinary, musculoskeletal, integument and psychiatric systems are negative, except as otherwise noted.     Objective    Exam  /74 (BP Location: Right arm, Cuff Size: Adult Large)   Pulse 72   Temp 97.5  F (36.4  C) (Temporal)   Resp 19   Ht 1.875 m (6' 1.82\")   Wt 107.5 kg (237 lb)   SpO2 98%   BMI 30.58 kg/m     Estimated body mass index is 30.58 kg/m  as calculated from the following:    Height as of this encounter: 1.875 m (6' 1.82\").    Weight as of this encounter: 107.5 kg (237 lb).    Physical Exam  GENERAL: alert and no distress  EYES: Eyes grossly normal to inspection, PERRL and conjunctivae and sclerae normal  HENT: ear canals and TM's normal, nose and mouth without ulcers or lesions  NECK: no adenopathy, no asymmetry, masses, or scars  RESP: lungs clear to auscultation - no rales, rhonchi or wheezes  CV: regular rate and rhythm, normal S1 S2, no S3 or S4, no murmur, click or rub, no peripheral edema  ABDOMEN: soft, nontender,  no masses and bowel sounds normal  MS: no gross musculoskeletal defects noted, no edema  SKIN: no suspicious lesions or rashes  NEURO: Normal strength and tone, mentation intact and speech normal  PSYCH: mentation appears normal, affect normal/bright      Signed Electronically by: Jonnie Davis MD    "

## 2024-10-23 NOTE — PATIENT INSTRUCTIONS
[facility] COLON AND  RECTAL SURGERY CLINIC [place] at [phone].    Patient Education   Preventive Care Advice   This is general advice given by our system to help you stay healthy. However, your care team may have specific advice just for you. Please talk to your care team about your preventive care needs.  Nutrition  Eat 5 or more servings of fruits and vegetables each day.  Try wheat bread, brown rice and whole grain pasta (instead of white bread, rice, and pasta).  Get enough calcium and vitamin D. Check the label on foods and aim for 100% of the RDA (recommended daily allowance).  Lifestyle  Exercise at least 150 minutes each week  (30 minutes a day, 5 days a week).  Do muscle strengthening activities 2 days a week. These help control your weight and prevent disease.  No smoking.  Wear sunscreen to prevent skin cancer.  Have a dental exam and cleaning every 6 months.  Yearly exams  See your health care team every year to talk about:  Any changes in your health.  Any medicines your care team has prescribed.  Preventive care, family planning, and ways to prevent chronic diseases.  Shots (vaccines)   HPV shots (up to age 26), if you've never had them before.  Hepatitis B shots (up to age 59), if you've never had them before.  COVID-19 shot: Get this shot when it's due.  Flu shot: Get a flu shot every year.  Tetanus shot: Get a tetanus shot every 10 years.  Pneumococcal, hepatitis A, and RSV shots: Ask your care team if you need these based on your risk.  Shingles shot (for age 50 and up)  General health tests  Diabetes screening:  Starting at age 35, Get screened for diabetes at least every 3 years.  If you are younger than age 35, ask your care team if you should be screened for diabetes.  Cholesterol test: At age 39, start having a cholesterol test every 5 years, or more often if advised.  Bone density scan (DEXA): At age 50, ask your care team if you should have this scan for osteoporosis  (brittle bones).  Hepatitis C: Get tested at least once in your life.  STIs (sexually transmitted infections)  Before age 24: Ask your care team if you should be screened for STIs.  After age 24: Get screened for STIs if you're at risk. You are at risk for STIs (including HIV) if:  You are sexually active with more than one person.  You don't use condoms every time.  You or a partner was diagnosed with a sexually transmitted infection.  If you are at risk for HIV, ask about PrEP medicine to prevent HIV.  Get tested for HIV at least once in your life, whether you are at risk for HIV or not.  Cancer screening tests  Cervical cancer screening: If you have a cervix, begin getting regular cervical cancer screening tests starting at age 21.  Breast cancer scan (mammogram): If you've ever had breasts, begin having regular mammograms starting at age 40. This is a scan to check for breast cancer.  Colon cancer screening: It is important to start screening for colon cancer at age 45.  Have a colonoscopy test every 10 years (or more often if you're at risk) Or, ask your provider about stool tests like a FIT test every year or Cologuard test every 3 years.  To learn more about your testing options, visit:   .  For help making a decision, visit:   https://bit.ly/dy07623.  Prostate cancer screening test: If you have a prostate, ask your care team if a prostate cancer screening test (PSA) at age 55 is right for you.  Lung cancer screening: If you are a current or former smoker ages 50 to 80, ask your care team if ongoing lung cancer screenings are right for you.  For informational purposes only. Not to replace the advice of your health care provider. Copyright   2023 [employer]. All rights reserved. Clinically reviewed by the [facility] Transitions Program. "[employer] [id number] - REV 01/24.

## 2024-10-28 ENCOUNTER — OFFICE VISIT (OUTPATIENT)
Dept: SURGERY | Facility: CLINIC | Age: 34
End: 2024-10-28
Attending: FAMILY MEDICINE
Payer: COMMERCIAL

## 2024-10-28 VITALS
OXYGEN SATURATION: 98 % | BODY MASS INDEX: 30.85 KG/M2 | WEIGHT: 232.8 LBS | DIASTOLIC BLOOD PRESSURE: 71 MMHG | HEART RATE: 77 BPM | HEIGHT: 73 IN | SYSTOLIC BLOOD PRESSURE: 125 MMHG

## 2024-10-28 DIAGNOSIS — K60.2 ANAL FISSURE: ICD-10-CM

## 2024-10-28 PROCEDURE — 99212 OFFICE O/P EST SF 10 MIN: CPT | Performed by: NURSE PRACTITIONER

## 2024-10-28 ASSESSMENT — PAIN SCALES - GENERAL: PAINLEVEL_OUTOF10: NO PAIN (0)

## 2024-10-28 NOTE — PROGRESS NOTES
Colon and Rectal Surgery Consult Clinic Note    Referring provider:  Jonnie Davis MD  [address] [place] [address]     RE: Alexis Ibrahim  : 1990  TERESA: 10/28/2024    Alexis Ibrahim is a very pleasant 34 year old male  with history of anal fissure who returns to Colorectal clinic due to ongoing symptoms of anal itching.     PLEASE SEE NOTE BELOW FOR PHYSICAL EXAMINATION, REVIEW OF SYSTEMS, AND OTHER HISTORY.    Assessment/Plan: 34 year old male without a significant past medical history who was last seen in colorectal clinic in 2022 and treated for anal fissure.  Presents to clinic again with anal itching.    # Pruritus ani   Fissure appears healed. Discussed that this is likely due to leakage of moisture. Recommended a fiber supplement such as Metamucil, Citrucel, or Benefiber to bulk up stools. Try folding a gauze in the buttock crease and determine whether there is any fecal staining that could be contributing and to keep area dry, Calmoseptine cream or zinc cream, Avoid scratching to avoid further trauma, increase fiber and water in diet, diet modification-try avoiding spicy foods, caffeine, tomatoes, carbonated beverages, milk products, cheese, chocolate, nuts, etc., using wet wipes rather than toilet paper, try wearing only cotton underwear, and avoid perfume-containing soaps (can try Dove soap). Try these interventions for 6-8 weeks and return to clinic if symptoms persist.      Return to clinic as needed if symptoms persistent despite above.     PLEASE SEE NOTE BELOW FOR PHYSICAL EXAMINATION, REVIEW OF SYSTEMS, AND OTHER HISTORY.    -------------------------------------------------------------------------------------------------------------------    Patient last seen in colorectal clinic in 2022 when he was treated for anal fissure.  He completed 6 to 8 weeks of topical therapy after this visit pain resolved but he has continued to have anal itching since then.   No pain with bowel movements.  Occasional blood with wiping.  itching occurs during the day and wakes him up at night.    Having 2-3 bowel movements per day which is his baseline.  Reports stool is soft and does not require straining during bowel movement.  Not on any fiber supplements although he reports high-fiber diet.    He has a cousin with Crohn's disease but no first-degree relatives with IBD or family history of colorectal cancer.      Medical history:  Past Medical History:   Diagnosis Date    CARDIOVASCULAR SCREENING; LDL GOAL LESS THAN 160 05/02/2011    Emesis     NOCTURNAL    Uncomplicated asthma 2015    Moderate asthma. Rescue inhaler.       Surgical history:  Past Surgical History:   Procedure Laterality Date    none         Problem list:    Patient Active Problem List    Diagnosis Date Noted    Intermittent asthma, uncomplicated 08/02/2017     Priority: Medium    CARDIOVASCULAR SCREENING; LDL GOAL LESS THAN 160 05/02/2011     Priority: Medium       Medications:  Current Outpatient Medications   Medication Sig Dispense Refill    albuterol (PROAIR HFA/PROVENTIL HFA/VENTOLIN HFA) 108 (90 Base) MCG/ACT inhaler Inhale 2 puffs into the lungs every 6 hours as needed for shortness of breath. 8.5 g 5    diltiazem 2% in PLO gel To anal opening three times daily.  Use a pea-sized amount.  Store at room temperature. 60 g 1    EnovaRX-Lidocaine HCl 5 % cream Apply topically 4 times daily as needed Apply thin layer to perianal skin up to 4 times a day for pain. 120 g 1    hydrOXYzine elayne (VISTARIL) 50 MG capsule Take 1 capsule (50 mg) by mouth 3 times daily as needed for anxiety. 90 capsule 1    sildenafil (VIAGRA) 100 MG tablet Take 0.5-1 tablets ( mg) by mouth daily as needed (ED) 12 tablet 3    fexofenadine (ALLEGRA) 180 MG tablet Take by mouth as needed         Allergies:  Allergies   Allergen Reactions    Cat Hair Extract Shortness Of Breath    Pcn [Penicillins] Nausea and Vomiting       Family  history:  Family History   Problem Relation Age of Onset    Unknown/Adopted Mother     Unknown/Adopted Father     Glaucoma No family hx of     Macular Degeneration No family hx of        Social history:  Social History     Socioeconomic History    Marital status:      Spouse name: Not on file    Number of children: Not on file    Years of education: 14    Highest education level: Not on file   Occupational History     Employer: STUDENT   Tobacco Use    Smoking status: Never     Passive exposure: Never    Smokeless tobacco: Never   Vaping Use    Vaping status: Never Used   Substance and Sexual Activity    Alcohol use: Yes     Comment: social     Drug use: No    Sexual activity: Yes     Partners: Female     Birth control/protection: Pull-out method, Condom, I.U.D.   Other Topics Concern    Parent/sibling w/ CABG, MI or angioplasty before 65F 55M? No   Social History Narrative    Currently residing in a dorm at college.     Social Drivers of Health     Financial Resource Strain: Low Risk  (10/22/2024)    Financial Resource Strain     Within the past 12 months, have you or your family members you live with been unable to get utilities (heat, electricity) when it was really needed?: No   Food Insecurity: Low Risk  (10/22/2024)    Food Insecurity     Within the past 12 months, did you worry that your food would run out before you got money to buy more?: No     Within the past 12 months, did the food you bought just not last and you didn t have money to get more?: No   Transportation Needs: Low Risk  (10/22/2024)    Transportation Needs     Within the past 12 months, has lack of transportation kept you from medical appointments, getting your medicines, non-medical meetings or appointments, work, or from getting things that you need?: No   Physical Activity: Sufficiently Active (10/22/2024)    Exercise Vital Sign     Days of Exercise per Week: 4 days     Minutes of Exercise per Session: 40 min   Stress: Stress  "Concern Present (10/22/2024)    [employer] [place] [employer] - Occupational Stress Questionnaire     Feeling of Stress : To some extent   Social Connections: Unknown (10/22/2024)    Social Connection and Isolation Panel [NHANES]     Frequency of Communication with Friends and Family: Not on file     Frequency of Social Gatherings with Friends and Family: Once a week     Attends [employer]ze Services: Not on file     Active Member of Clubs or Organizations: Not on file     Attends Club or Organization Meetings: Not on file     Marital Status: Not on file   Interpersonal Safety: Low Risk  (10/23/2024)    Interpersonal Safety     Do you feel physically and emotionally safe where you currently live?: Yes     Within the past 12 months, have you been hit, slapped, kicked or otherwise physically hurt by someone?: No     Within the past 12 months, have you been humiliated or emotionally abused in other ways by your partner or ex-partner?: No   Housing Stability: Low Risk  (10/22/2024)    Housing Stability     Do you have housing? : Yes     Are you worried about losing your housing?: No         Nursing Notes:   Fara Ji  10/28/2024  9:27 AM  Signed  Chief Complaint   Patient presents with    Follow Up     fiisure       Vitals:    10/28/24 0924   BP: 125/71   BP Location: Left arm   Patient Position: Sitting   Cuff Size: Adult Regular   Pulse: 77   SpO2: 98%   Weight: 232 lb 12.8 oz   Height: 6' 1\"       Body mass index is 30.71 kg/m .    Fara Ji, EMT     Physical Examination:  /71 (BP Location: Left arm, Patient Position: Sitting, Cuff Size: Adult Regular)   Pulse 77   Ht 1.854 m (6' 1\")   Wt 105.6 kg (232 lb 12.8 oz)   SpO2 98%   BMI 30.71 kg/m    General: no acute distress, appears well.   HEENT: no scleral icterus  Abdomen: soft, nondistended.   Extremities: no swelling. Moving extremities equally.   Perianal external examination:  Perianal skin: excoriation and lichenification present   Lesions: " No evidence of an external lesion, nodularity, or induration in the perianal region.  Eversion of buttocks: There was not evidence of an anal fissure. Details: N/A.  Skin tags or external hemorrhoids: No.  Digital rectal examination: Was deferred.    Anoscopy: Was deferred.      Procedures:  None    15 minutes spent on the date of encounter performing chart review, history and exam, documentation and further activities as noted above

## 2024-10-28 NOTE — PATIENT INSTRUCTIONS
GUIDE FOR PATIENTS WITH PRURITUS ANI    Pruritus ani, literally, means  itchy anus . This condition can have numerous causes, which can be as simple as hemorrhoids or as complex as uncommon infections and skin diseases. The purpose of the history, physical exam, and lab studies we have done and ordered will be to narrow the diagnostic possibilities to assure that you are getting the right treatment. As a first step, there are a number of things that need to be started and continued for the next 3-4 weeks.     1. Diet. Multiple foods can cause or worsen inflammation and itchiness around the anus. These include: coffee, tea, soda, alcohol, citrus, tomatoes, berries, and spicy or heavily spiced foods. These should be reduced or eliminated, if possible, from your diet.     2. Stop ALL creams, ointments, and topical preparations that you apply to your anus. It is very important that you inform us of all medications your are currently taking, including antibiotics, pain killers, and over-the-counter or natural medications. Occasionally (specially when the skin is very irritated), we will recommend a barrier cream, such as Calmoseptine or Desitin. Apply this 3-4 times per day.    3. We recommend daily bathing, preferably in a shower. Do not use medicated or perfumed soaps. Dove  is ideal as it is not soap and does not irritate your skin and do not use between buttocks. Washing-off the anus completely at the end of your shower is helpful to avoid any soap or shampoo residue on your skin. Also, using a hair dryer (on the cool air setting) is recommended to completely dry the perianal skin. If you have persistent leakage or a wet anus, we recommend placing a cotton ball or dry gauze and changing it as needed during the day. Loose, cotton undergarments are also recommended. Avoid recurrent and over-vigorous wiping after bowel movements. Unscented and non-medicated  wet-wipes  or  baby wipes  are preferred.    4. Start on a  fiber supplement such as Citrucel 3 times a day with 8-10 glasses of water a day. This will allow for a soft and easy bowel movement and will help  soak up  the extra moisture in your colon and rectum.

## 2024-10-28 NOTE — NURSING NOTE
"Chief Complaint   Patient presents with    Follow Up     fiisure       Vitals:    10/28/24 0924   BP: 125/71   BP Location: Left arm   Patient Position: Sitting   Cuff Size: Adult Regular   Pulse: 77   SpO2: 98%   Weight: 232 lb 12.8 oz   Height: 6' 1\"       Body mass index is 30.71 kg/m .    Fara Ji, EMT  "